# Patient Record
Sex: MALE | Race: WHITE | Employment: OTHER | URBAN - METROPOLITAN AREA
[De-identification: names, ages, dates, MRNs, and addresses within clinical notes are randomized per-mention and may not be internally consistent; named-entity substitution may affect disease eponyms.]

---

## 2017-01-03 DIAGNOSIS — I10 ESSENTIAL HYPERTENSION: Primary | ICD-10-CM

## 2017-01-04 RX ORDER — TRIAMTERENE AND HYDROCHLOROTHIAZIDE 75; 50 MG/1; MG/1
TABLET ORAL
Qty: 45 TABLET | Refills: 2 | Status: SHIPPED | OUTPATIENT
Start: 2017-01-04 | End: 2017-09-30

## 2017-01-04 RX ORDER — LISINOPRIL 40 MG/1
TABLET ORAL
Qty: 90 TABLET | Refills: 2 | Status: SHIPPED | OUTPATIENT
Start: 2017-01-04 | End: 2017-09-30

## 2017-01-04 RX ORDER — ATENOLOL 50 MG/1
TABLET ORAL
Qty: 90 TABLET | Refills: 2 | Status: SHIPPED | OUTPATIENT
Start: 2017-01-04 | End: 2017-09-30

## 2017-02-27 RX ORDER — ASPIRIN 81 MG
TABLET, DELAYED RELEASE (ENTERIC COATED) ORAL
Qty: 90 TABLET | Refills: 0 | Status: SHIPPED | OUTPATIENT
Start: 2017-02-27 | End: 2017-05-30

## 2017-02-27 NOTE — TELEPHONE ENCOUNTER
Refill request sent from pharmacy for Aspirin. Pt had Rx for this before. LOV 11/18/16 and labs 11/08/16. Will you approve refill ? Routed to Dr Cassidy Kinney.

## 2017-04-19 ENCOUNTER — APPOINTMENT (OUTPATIENT)
Dept: LAB | Facility: HOSPITAL | Age: 67
End: 2017-04-19
Payer: MEDICARE

## 2017-04-19 DIAGNOSIS — Z12.11 ENCOUNTER FOR SCREENING COLONOSCOPY: ICD-10-CM

## 2017-04-19 PROCEDURE — 93010 ELECTROCARDIOGRAM REPORT: CPT | Performed by: INTERNAL MEDICINE

## 2017-04-19 PROCEDURE — 93005 ELECTROCARDIOGRAM TRACING: CPT

## 2017-05-08 ENCOUNTER — TELEPHONE (OUTPATIENT)
Dept: FAMILY MEDICINE CLINIC | Facility: CLINIC | Age: 67
End: 2017-05-08

## 2017-05-08 DIAGNOSIS — I10 ESSENTIAL HYPERTENSION, BENIGN: ICD-10-CM

## 2017-05-08 DIAGNOSIS — E11.49 TYPE II DIABETES MELLITUS WITH NEUROLOGICAL MANIFESTATIONS (HCC): Primary | ICD-10-CM

## 2017-05-08 NOTE — TELEPHONE ENCOUNTER
Patient scheduled MA supervisit with Dr. Elisabeth Salazar on 9/5/17.  Would like labs placed at [address] [place] [address].

## 2017-05-09 NOTE — TELEPHONE ENCOUNTER
Spoke to patient regarding his fasting labs 10-12 hours water only. I asked him if he wanted to schedule sooner to address his DM per Dr. Janee Pedraza and he stated he would like to keep it in September.

## 2017-05-17 ENCOUNTER — ANESTHESIA (OUTPATIENT)
Dept: ENDOSCOPY | Facility: HOSPITAL | Age: 67
End: 2017-05-17
Payer: MEDICARE

## 2017-05-17 ENCOUNTER — ANESTHESIA EVENT (OUTPATIENT)
Dept: ENDOSCOPY | Facility: HOSPITAL | Age: 67
End: 2017-05-17
Payer: MEDICARE

## 2017-05-17 ENCOUNTER — HOSPITAL ENCOUNTER (OUTPATIENT)
Facility: HOSPITAL | Age: 67
Setting detail: HOSPITAL OUTPATIENT SURGERY
Discharge: HOME | End: 2017-05-17
Attending: INTERNAL MEDICINE | Admitting: INTERNAL MEDICINE
Payer: MEDICARE

## 2017-05-17 ENCOUNTER — SURGERY (OUTPATIENT)
Age: 67
End: 2017-05-17

## 2017-05-17 VITALS
BODY MASS INDEX: 35.55 KG/M2 | WEIGHT: 240 LBS | HEART RATE: 65 BPM | SYSTOLIC BLOOD PRESSURE: 108 MMHG | RESPIRATION RATE: 16 BRPM | OXYGEN SATURATION: 98 % | DIASTOLIC BLOOD PRESSURE: 64 MMHG | TEMPERATURE: 98 F | HEIGHT: 69 IN

## 2017-05-17 DIAGNOSIS — Z12.11 ENCOUNTER FOR SCREENING COLONOSCOPY: Primary | ICD-10-CM

## 2017-05-17 PROCEDURE — 0DBL8ZX EXCISION OF TRANSVERSE COLON, VIA NATURAL OR ARTIFICIAL OPENING ENDOSCOPIC, DIAGNOSTIC: ICD-10-PCS | Performed by: INTERNAL MEDICINE

## 2017-05-17 PROCEDURE — 0DBH8ZX EXCISION OF CECUM, VIA NATURAL OR ARTIFICIAL OPENING ENDOSCOPIC, DIAGNOSTIC: ICD-10-PCS | Performed by: INTERNAL MEDICINE

## 2017-05-17 PROCEDURE — 88305 TISSUE EXAM BY PATHOLOGIST: CPT | Performed by: INTERNAL MEDICINE

## 2017-05-17 PROCEDURE — 82962 GLUCOSE BLOOD TEST: CPT

## 2017-05-17 RX ORDER — SODIUM CHLORIDE, SODIUM LACTATE, POTASSIUM CHLORIDE, CALCIUM CHLORIDE 600; 310; 30; 20 MG/100ML; MG/100ML; MG/100ML; MG/100ML
INJECTION, SOLUTION INTRAVENOUS CONTINUOUS
Status: DISCONTINUED | OUTPATIENT
Start: 2017-05-17 | End: 2017-05-17

## 2017-05-17 NOTE — ANESTHESIA PREPROCEDURE EVALUATION
PRE-OP EVALUATION    Patient Name: Edward Griffiths    Pre-op Diagnosis: PERSONAL HISTORY OF COLONIC POLYPS    Procedure(s):  COLONOSCOPY     Surgeon(s) and Role:     * Agustín Serrano,  - Dejuan    Pre-op vitals reviewed.   Temp: 97.9 °F (36.6 °C)  Pulse Bilateral         Smoking status: Former Smoker  0.50 Packs/Day  For 5.00 Years     Quit date: 01/01/1970    Smokeless tobacco: Never Used    Alcohol Use: Yes  0.0 - 0.6 oz/week    0-1 Standard drinks or equivalent per week            Drug Use: No     Avai

## 2017-05-17 NOTE — OPERATIVE REPORT
COLONOSCOPY WITH BIOPSY AND SNARE POLYPECTOMY      [address] Patient Status:  Hospital Outpatient Surgery    1950 MRN [id number]   [facility] ENDOSCOPY Attending Ruperto Soto, [address] [facility]note 2[facility] Day # 0 PCP Hannah Morales colon: Few scattered diverticula. Transverse colon: Two sessile polyps ranging 5-6 mm s/p cold snare polypectomy. Descending colon: The mucosa and vascular pattern were normal.  Sigmoid colon: Multiple diverticula.    Rectosigmoid/Rectum: The previous E

## 2017-05-17 NOTE — H&P
History & Physical Examination    Patient Name: Mark Tello  MRN: [id number]  CSN: [id number]  YOB: 1950    Diagnosis: Rectal polyp s/p EMR    Present Illness: Mr. Jorje Infante is a 78year-old male with rectal polyp s/p EMR presents for tejal Past Surgical History    BONE REPLACEMENT GRAFT  7/7/2010    Comment repair shattered L heel    REMOVAL OF KIDNEY STONE  1980    SKIN SURGERY  8-19-15    Comment Mohs surgery for [facility] to left lower eyelid    EGD N/A 9/23/2016    Comment Procedure: E

## 2017-05-17 NOTE — ANESTHESIA POSTPROCEDURE EVALUATION
[address] Patient Status:  Hospital Outpatient Surgery   Age/Gender 78year old male MRN [id number]   Location [facility]. Attending Lou Aponte, [address] [facility] Day # 0 PCP Chas Acevedo MD       Anesthesia Post-op N

## 2017-05-31 RX ORDER — ASPIRIN 81 MG
TABLET, DELAYED RELEASE (ENTERIC COATED) ORAL
Qty: 90 TABLET | Refills: 3 | Status: SHIPPED | OUTPATIENT
Start: 2017-05-31 | End: 2019-10-20

## 2017-07-31 ENCOUNTER — OFFICE VISIT (OUTPATIENT)
Dept: FAMILY MEDICINE CLINIC | Facility: CLINIC | Age: 67
End: 2017-07-31

## 2017-07-31 VITALS
OXYGEN SATURATION: 98 % | WEIGHT: 240 LBS | BODY MASS INDEX: 35.55 KG/M2 | DIASTOLIC BLOOD PRESSURE: 100 MMHG | SYSTOLIC BLOOD PRESSURE: 176 MMHG | TEMPERATURE: 98 F | HEIGHT: 69 IN | HEART RATE: 62 BPM

## 2017-07-31 DIAGNOSIS — R05.9 COUGH: ICD-10-CM

## 2017-07-31 DIAGNOSIS — J01.00 ACUTE NON-RECURRENT MAXILLARY SINUSITIS: Primary | ICD-10-CM

## 2017-07-31 PROCEDURE — 99213 OFFICE O/P EST LOW 20 MIN: CPT | Performed by: FAMILY MEDICINE

## 2017-07-31 RX ORDER — BENZONATATE 100 MG/1
200 CAPSULE ORAL 3 TIMES DAILY PRN
Qty: 30 CAPSULE | Refills: 0 | Status: SHIPPED | OUTPATIENT
Start: 2017-07-31 | End: 2017-09-05 | Stop reason: ALTCHOICE

## 2017-07-31 RX ORDER — CLARITHROMYCIN 500 MG/1
500 TABLET, COATED ORAL 2 TIMES DAILY
Qty: 20 TABLET | Refills: 0 | Status: SHIPPED | OUTPATIENT
Start: 2017-07-31 | End: 2017-08-10

## 2017-07-31 RX ORDER — POLYETHYLENE GLYCOL 3350, SODIUM CHLORIDE, POTASSIUM CHLORIDE, SODIUM BICARBONATE, AND SODIUM SULFATE 240; 5.84; 2.98; 6.72; 22.72 G/4L; G/4L; G/4L; G/4L; G/4L
POWDER, FOR SOLUTION ORAL
Refills: 0 | COMMUNITY
Start: 2017-05-15 | End: 2017-06-01

## 2017-07-31 NOTE — PATIENT INSTRUCTIONS
Take antibiotics with food and plenty of water. Eat yogurt or take probiotic daily. (Ingrid Godinez is a good example of an OTC probiotic)  Make sure to finish the entire antibiotic treatment.   Increase fluids and rest.   Take tessalon perles-- 1-2 every 8 hours

## 2017-08-01 NOTE — PROGRESS NOTES
CHIEF COMPLAINT:   Patient presents with:  Cough/URI: started with fever/body aches, sx started improving after 3-4 days, but now cough is more spasmodic. worse with talking. feels a bit wheezy.        HPI:   Angel Luis Sharpe is a 78year old male who present Essential hypertension, benign 6/29/2012   • Glucose intolerance (pre-diabetes)    • High blood pressure    • Impaired fasting glucose 6/29/2012   • Nephrolithiasis    • Peripheral neuropathy, idiopathic 1/14/2013   • Sleep apnea    • Visual impairment abdominal pain  NEURO: + sinus headaches. No numbness or tingling in face.     EXAM:   BP (!) 176/100   Pulse 62   Temp 98.1 °F (36.7 °C) (Oral)   Ht 69\"   Wt 240 lb   SpO2 98%   BMI 35.44 kg/m²   GENERAL: well developed, well nourished,in no apparent dis 8 hours as needed. Or use delsym otc. Consider applying miladys's vapo-rub or eucalpytus oil to chest and feet at bedtime to reduce chest and nasal congestion. Monitor symptoms and contact the office if no better in 2-3 days.     Call Dr. Janee Pedraza today to jm

## 2017-08-23 LAB
ALBUMIN/GLOBULIN RATIO: 1.7 (CALC) (ref 1–2.5)
ALBUMIN: 4.2 G/DL (ref 3.6–5.1)
ALKALINE PHOSPHATASE: 49 U/L (ref 40–115)
ALT: 29 U/L (ref 9–46)
AST: 27 U/L (ref 10–35)
BILIRUBIN, TOTAL: 0.6 MG/DL (ref 0.2–1.2)
BUN: 17 MG/DL (ref 7–25)
CALCIUM: 9.3 MG/DL (ref 8.6–10.3)
CARBON DIOXIDE: 24 MMOL/L (ref 20–31)
CHLORIDE: 103 MMOL/L (ref 98–110)
CHOL/HDLC RATIO: 3.3 (CALC)
CHOLESTEROL, TOTAL: 145 MG/DL
CREATININE, RANDOM URINE: 102 MG/DL (ref 20–370)
CREATININE: 0.99 MG/DL (ref 0.7–1.25)
EGFR IF AFRICN AM: 91 ML/MIN/1.73M2
EGFR IF NONAFRICN AM: 78 ML/MIN/1.73M2
GLOBULIN: 2.5 G/DL (CALC) (ref 1.9–3.7)
GLUCOSE: 118 MG/DL (ref 65–99)
HDL CHOLESTEROL: 44 MG/DL
HEMOGLOBIN A1C: 6.4 % OF TOTAL HGB
LDL-CHOLESTEROL: 82 MG/DL (CALC)
MICROALBUMIN/CREATININE RATIO, RANDOM URINE: 20 MCG/MG CREAT
MICROALBUMIN: 2 MG/DL
NON-HDL CHOLESTEROL: 101 MG/DL (CALC)
POTASSIUM: 3.7 MMOL/L (ref 3.5–5.3)
PROTEIN, TOTAL: 6.7 G/DL (ref 6.1–8.1)
SODIUM: 138 MMOL/L (ref 135–146)
TRIGLYCERIDES: 96 MG/DL

## 2017-08-30 ENCOUNTER — TELEPHONE (OUTPATIENT)
Dept: FAMILY MEDICINE CLINIC | Facility: CLINIC | Age: 67
End: 2017-08-30

## 2017-08-30 NOTE — TELEPHONE ENCOUNTER
Called patient and left message to contact the office to complete annual health assessment before appointment 09/05/17

## 2017-09-05 ENCOUNTER — OFFICE VISIT (OUTPATIENT)
Dept: FAMILY MEDICINE CLINIC | Facility: CLINIC | Age: 67
End: 2017-09-05

## 2017-09-05 VITALS
SYSTOLIC BLOOD PRESSURE: 130 MMHG | TEMPERATURE: 98 F | DIASTOLIC BLOOD PRESSURE: 72 MMHG | HEIGHT: 67.5 IN | RESPIRATION RATE: 14 BRPM | BODY MASS INDEX: 36.45 KG/M2 | HEART RATE: 68 BPM | WEIGHT: 235 LBS

## 2017-09-05 DIAGNOSIS — Z99.89 OSA ON CPAP: ICD-10-CM

## 2017-09-05 DIAGNOSIS — Z12.5 SCREENING FOR MALIGNANT NEOPLASM OF PROSTATE: ICD-10-CM

## 2017-09-05 DIAGNOSIS — E11.42 DIABETIC PERIPHERAL NEUROPATHY ASSOCIATED WITH TYPE 2 DIABETES MELLITUS (HCC): ICD-10-CM

## 2017-09-05 DIAGNOSIS — I10 ESSENTIAL HYPERTENSION, BENIGN: ICD-10-CM

## 2017-09-05 DIAGNOSIS — G47.33 OSA ON CPAP: ICD-10-CM

## 2017-09-05 DIAGNOSIS — Z00.00 ENCOUNTER FOR ANNUAL HEALTH EXAMINATION: Primary | ICD-10-CM

## 2017-09-05 DIAGNOSIS — E11.49 TYPE II DIABETES MELLITUS WITH NEUROLOGICAL MANIFESTATIONS (HCC): ICD-10-CM

## 2017-09-05 DIAGNOSIS — Z85.828 HISTORY OF BASAL CELL CARCINOMA OF EYELID: ICD-10-CM

## 2017-09-05 DIAGNOSIS — E66.01 SEVERE OBESITY WITH BODY MASS INDEX (BMI) OF 35.0 TO 39.9 WITH COMORBIDITY (HCC): ICD-10-CM

## 2017-09-05 PROCEDURE — 96160 PT-FOCUSED HLTH RISK ASSMT: CPT | Performed by: FAMILY MEDICINE

## 2017-09-05 PROCEDURE — G0439 PPPS, SUBSEQ VISIT: HCPCS | Performed by: FAMILY MEDICINE

## 2017-09-05 NOTE — PROGRESS NOTES
HPI:   Abdulaziz Campbell is a 78year old male who presents for a MA (Medicare Advantage) Supervisit (Once per calendar year). Preventative Screening  Colonoscopy - May of this year. Due for repeat 1 yr.  Danny Bethea at [address] [place] [address]. Prostate - \"ok\".   Stil Ni Tadeo MD:  ASPIRIN EC LOW DOSE 81 MG Oral Tab EC TAKE 1 TABLET BY MOUTH DAILY   TRIAMTERENE-HCTZ 75-50 MG Oral Tab TAKE ONE-HALF TABLET BY MOUTH DAILY   LISINOPRIL 40 MG Oral Tab TAKE 1 TABLET BY MOUTH ONCE DAILY   ATENOLOL 50 MG Oral Tab TAKE nocturia, no complaint of urinary incontinence  MUSCULOSKELETAL: denies back pain  NEURO: denies headaches  PSYCHE: denies depression or anxiety  HEMATOLOGIC: denies hx of anemia  ENDOCRINE: denies thyroid history  ALL/ASTHMA: denies hx of allergy or asthm monofilament/sensation of both feet is abnormal only very distally. Normal on midfoot. .  Pulsation pedal pulse exam of both lower legs/feet is normal as well.    Pulses: 2+ and symmetric   Skin: Skin color, texture, turgor normal, no rashes or lesions   L file in [facility]:    Mayurinés Esequiel does not have a Power of  for [employer] on file in [address] [place] [address] Rd. Discussed with patient and provided information           PLAN:  The patient indicates understanding of these issues and agrees to the plan.     No Follow-up 0-No    Fall/Risk Scorin    Scoring Interpretation: 0 - 3 No Risk     Depression Screening (PHQ-2/PHQ-9): Over the LAST 2 WEEKS   Little interest or pleasure in doing things (over the last two weeks)?: Not at all    Feeling down, depressed, or hopeless Dilated Eye Exam 3/19/2016       Prostate Cancer Screening      PSA  Annually PSA due on 11/14/2017  Update Health Maintenance if applicable   Immunizations      Influenza No orders found for this or any previous visit.  Update Immunization Activity if appl

## 2017-09-05 NOTE — PATIENT INSTRUCTIONS
Alina Rasheed's SCREENING SCHEDULE   Tests on this list are recommended by your physician but may not be covered, or covered at this frequency, by your insurer. Please check with your insurance carrier before scheduling to verify coverage.     PREVENTATI Colonoscopy Screen   Covered every 10 years- more often if abnormal Colonoscopy,10 Years due on 05/17/2027 Update Health Maintenance if applicable    Flex Sigmoidoscopy Screen  Covered every 5 years No results found for this or any previous visit.  No fl visit. This may be covered with your prescription benefits, but Medicare does not cover unless Medically needed    Zoster (Not covered by Medicare Part B) No orders found for this or any previous visit.  This may be covered with your pharmacy  prescription

## 2017-09-20 ENCOUNTER — PATIENT OUTREACH (OUTPATIENT)
Dept: CASE MANAGEMENT | Age: 67
End: 2017-09-20

## 2017-09-30 DIAGNOSIS — I10 ESSENTIAL HYPERTENSION: ICD-10-CM

## 2017-09-30 RX ORDER — ATENOLOL 50 MG/1
TABLET ORAL
Qty: 90 TABLET | Refills: 0 | Status: SHIPPED | OUTPATIENT
Start: 2017-09-30 | End: 2018-03-08

## 2017-09-30 RX ORDER — LISINOPRIL 40 MG/1
TABLET ORAL
Qty: 90 TABLET | Refills: 0 | Status: SHIPPED | OUTPATIENT
Start: 2017-09-30 | End: 2018-01-03

## 2017-09-30 RX ORDER — TRIAMTERENE AND HYDROCHLOROTHIAZIDE 75; 50 MG/1; MG/1
TABLET ORAL
Qty: 45 TABLET | Refills: 0 | Status: SHIPPED | OUTPATIENT
Start: 2017-09-30 | End: 2018-01-03

## 2017-10-05 ENCOUNTER — TELEPHONE (OUTPATIENT)
Dept: FAMILY MEDICINE CLINIC | Facility: CLINIC | Age: 67
End: 2017-10-05

## 2017-10-05 RX ORDER — METOPROLOL SUCCINATE 100 MG/1
100 TABLET, EXTENDED RELEASE ORAL DAILY
Qty: 90 TABLET | Refills: 1 | Status: SHIPPED | OUTPATIENT
Start: 2017-10-05 | End: 2018-09-26

## 2017-10-09 ENCOUNTER — TELEPHONE (OUTPATIENT)
Dept: FAMILY MEDICINE CLINIC | Facility: CLINIC | Age: 67
End: 2017-10-09

## 2017-10-09 NOTE — TELEPHONE ENCOUNTER
Talked to patient assured him this was the proper dose as these are not bioequivalent he is OK with this and will take the medication as directed

## 2017-10-09 NOTE — TELEPHONE ENCOUNTER
Patient was currently prescribed Metoprolol Succinate  MG Oral Tablet 24 Hr due to atenolol being on back order. Patient is concerned about MG, states metoprolol is 100 MG and atenolol is 50 MG. Should he being taking half a pill?

## 2018-01-03 DIAGNOSIS — I10 ESSENTIAL HYPERTENSION: ICD-10-CM

## 2018-01-03 RX ORDER — LISINOPRIL 40 MG/1
TABLET ORAL
Qty: 90 TABLET | Refills: 1 | Status: SHIPPED | OUTPATIENT
Start: 2018-01-03 | End: 2018-06-30

## 2018-01-03 RX ORDER — TRIAMTERENE AND HYDROCHLOROTHIAZIDE 75; 50 MG/1; MG/1
TABLET ORAL
Qty: 45 TABLET | Refills: 1 | Status: SHIPPED | OUTPATIENT
Start: 2018-01-03 | End: 2019-01-12

## 2018-02-27 ENCOUNTER — TELEPHONE (OUTPATIENT)
Dept: FAMILY MEDICINE CLINIC | Facility: CLINIC | Age: 68
End: 2018-02-27

## 2018-02-27 DIAGNOSIS — Z12.5 SCREENING FOR MALIGNANT NEOPLASM OF PROSTATE: ICD-10-CM

## 2018-02-27 DIAGNOSIS — E11.49 TYPE II DIABETES MELLITUS WITH NEUROLOGICAL MANIFESTATIONS (HCC): ICD-10-CM

## 2018-02-27 DIAGNOSIS — Z13.6 SCREENING FOR CARDIOVASCULAR CONDITION: ICD-10-CM

## 2018-02-27 DIAGNOSIS — I10 ESSENTIAL HYPERTENSION, BENIGN: Primary | ICD-10-CM

## 2018-02-27 NOTE — TELEPHONE ENCOUNTER
Pt scheduled a Supervisit and would like a blood work order sent to [facility] he would like to have a Prostate test added to the order.

## 2018-03-02 ENCOUNTER — TELEPHONE (OUTPATIENT)
Dept: FAMILY MEDICINE CLINIC | Facility: CLINIC | Age: 68
End: 2018-03-02

## 2018-03-02 LAB
ALBUMIN/GLOBULIN RATIO: 1.5 (CALC) (ref 1–2.5)
ALBUMIN: 4.3 G/DL (ref 3.6–5.1)
ALKALINE PHOSPHATASE: 56 U/L (ref 40–115)
ALT: 20 U/L (ref 9–46)
AST: 22 U/L (ref 10–35)
BILIRUBIN, TOTAL: 0.7 MG/DL (ref 0.2–1.2)
BUN: 22 MG/DL (ref 7–25)
CALCIUM: 9.9 MG/DL (ref 8.6–10.3)
CARBON DIOXIDE: 26 MMOL/L (ref 20–31)
CHLORIDE: 104 MMOL/L (ref 98–110)
CHOL/HDLC RATIO: 3.2 (CALC)
CHOLESTEROL, TOTAL: 146 MG/DL
CREATININE, RANDOM URINE: 30 MG/DL (ref 20–370)
CREATININE: 0.96 MG/DL (ref 0.7–1.25)
EGFR IF AFRICN AM: 94 ML/MIN/1.73M2
EGFR IF NONAFRICN AM: 81 ML/MIN/1.73M2
GLOBULIN: 2.8 G/DL (CALC) (ref 1.9–3.7)
GLUCOSE: 110 MG/DL (ref 65–99)
HDL CHOLESTEROL: 45 MG/DL
HEMOGLOBIN A1C: 5.7 % OF TOTAL HGB
LDL-CHOLESTEROL: 84 MG/DL (CALC)
MICROALBUMIN/CREATININE RATIO, RANDOM URINE: 27 MCG/MG CREAT
MICROALBUMIN: 0.8 MG/DL
NON-HDL CHOLESTEROL: 101 MG/DL (CALC)
POTASSIUM: 4 MMOL/L (ref 3.5–5.3)
PROTEIN, TOTAL: 7.1 G/DL (ref 6.1–8.1)
PSA, TOTAL: 2.4 NG/ML
SODIUM: 141 MMOL/L (ref 135–146)
TRIGLYCERIDES: 84 MG/DL

## 2018-03-02 NOTE — TELEPHONE ENCOUNTER
PALAK for patient to call back to complete his annual health assessment prior to his appt with Dr. Cassidy Kinney on 3/8/18 at [address] [place] [address].

## 2018-03-08 ENCOUNTER — OFFICE VISIT (OUTPATIENT)
Dept: FAMILY MEDICINE CLINIC | Facility: CLINIC | Age: 68
End: 2018-03-08

## 2018-03-08 VITALS
SYSTOLIC BLOOD PRESSURE: 128 MMHG | BODY MASS INDEX: 33.87 KG/M2 | HEART RATE: 56 BPM | OXYGEN SATURATION: 98 % | TEMPERATURE: 98 F | HEIGHT: 67 IN | WEIGHT: 215.81 LBS | DIASTOLIC BLOOD PRESSURE: 64 MMHG | RESPIRATION RATE: 16 BRPM

## 2018-03-08 DIAGNOSIS — Z00.00 ENCOUNTER FOR ANNUAL HEALTH EXAMINATION: Primary | ICD-10-CM

## 2018-03-08 DIAGNOSIS — E66.01 SEVERE OBESITY WITH BODY MASS INDEX (BMI) OF 35.0 TO 39.9 WITH COMORBIDITY (HCC): ICD-10-CM

## 2018-03-08 DIAGNOSIS — G47.33 OSA ON CPAP: ICD-10-CM

## 2018-03-08 DIAGNOSIS — E11.42 DIABETIC PERIPHERAL NEUROPATHY ASSOCIATED WITH TYPE 2 DIABETES MELLITUS (HCC): ICD-10-CM

## 2018-03-08 DIAGNOSIS — I10 ESSENTIAL HYPERTENSION, BENIGN: ICD-10-CM

## 2018-03-08 DIAGNOSIS — Z85.828 HISTORY OF BASAL CELL CARCINOMA OF EYELID: ICD-10-CM

## 2018-03-08 DIAGNOSIS — M47.816 ARTHRITIS OF LUMBAR SPINE: ICD-10-CM

## 2018-03-08 DIAGNOSIS — E11.49 TYPE II DIABETES MELLITUS WITH NEUROLOGICAL MANIFESTATIONS (HCC): ICD-10-CM

## 2018-03-08 DIAGNOSIS — Z99.89 OSA ON CPAP: ICD-10-CM

## 2018-03-08 PROCEDURE — 96160 PT-FOCUSED HLTH RISK ASSMT: CPT | Performed by: FAMILY MEDICINE

## 2018-03-08 PROCEDURE — G0439 PPPS, SUBSEQ VISIT: HCPCS | Performed by: FAMILY MEDICINE

## 2018-03-08 NOTE — PATIENT INSTRUCTIONS
Scotty Rasheed's SCREENING SCHEDULE   Tests on this list are recommended by your physician but may not be covered, or covered at this frequency, by your insurer. Please check with your insurance carrier before scheduling to verify coverage.     PREVENTATI Colonoscopy Screen   Covered every 10 years- more often if abnormal Colonoscopy,10 Years due on 05/17/2027 Update Health Maintenance if applicable    Flex Sigmoidoscopy Screen  Covered every 5 years No results found for this or any previous visit.  No fl visit. This may be covered with your prescription benefits, but Medicare does not cover unless Medically needed    Zoster (Not covered by Medicare Part B) No orders found for this or any previous visit.  This may be covered with your pharmacy  prescription

## 2018-03-08 NOTE — PROGRESS NOTES
HPI:   Carina Mendoza is a 75year old male who presents for a MA (Medicare Advantage) Supervisit (Once per calendar year). Preventative Screening  Colonoscopy - 5/2017 - normal, repeat 1 yrs. Maggie. Prostate - PSA controlled - 2.4.   Not waking up He smoked tobacco in the past but quit greater than 12 months ago.   Smoking status: Former Smoker                                                              Packs/day: 0.50      Years: 5.00         Quit date: 1/1/1970  Smokeless tobacco: Never Used Hr Take 1 tablet (100 mg total) by mouth daily. ASPIRIN EC LOW DOSE 81 MG Oral Tab EC TAKE 1 TABLET BY MOUTH DAILY   multivitamin with iron Oral Tab Take 1 tablet by mouth daily.       MEDICAL INFORMATION:   He  has a past medical history of Basal cell ca 3/8/2018  9:01 AM  Entry User:  Edwin Jones RN  Screening Method:  Whisper Test  Whisper Test Result:  Pass               Visual Acuity  Right Eye Visual Acuity: Corrected Right Eye Chart Acuity: 20/20   Left Eye Visual Acuity: Corrected Left Eye Ирина CHRONIC CONDITIONS:   Tenisha Baez is a 75year old male who presents for a Medicare Assessment.      PLAN SUMMARY:     Tenisha Baez was seen in the office today:  had concerns including Physical (wants to discuss changing BP med as he has lost 40 lbs ) your current health state?: Good  How do you maintain positive mental well-being?: Social Interaction;Puzzles;Games; Visiting Friends; Visiting Family    This section provided for quick review of chart, separate sheet to patient  [address] [place] concentrates   Clients of institutions for the mentally retarded   Persons who live in the same house as a HepB virus carrier   Homosexual men   Illicit injectable drug abusers     Tetanus Toxoid  Only covered with a cut with metal- TD and TDaP Not covered

## 2018-05-15 RX ORDER — SODIUM CHLORIDE, SODIUM LACTATE, POTASSIUM CHLORIDE, CALCIUM CHLORIDE 600; 310; 30; 20 MG/100ML; MG/100ML; MG/100ML; MG/100ML
INJECTION, SOLUTION INTRAVENOUS CONTINUOUS
Status: CANCELLED | OUTPATIENT
Start: 2018-05-15

## 2018-06-01 ENCOUNTER — SURGERY (OUTPATIENT)
Age: 68
End: 2018-06-01

## 2018-06-01 ENCOUNTER — HOSPITAL ENCOUNTER (OUTPATIENT)
Facility: HOSPITAL | Age: 68
Setting detail: HOSPITAL OUTPATIENT SURGERY
Discharge: HOME | End: 2018-06-01
Attending: INTERNAL MEDICINE | Admitting: INTERNAL MEDICINE
Payer: MEDICARE

## 2018-06-01 ENCOUNTER — ANESTHESIA (OUTPATIENT)
Dept: ENDOSCOPY | Facility: HOSPITAL | Age: 68
End: 2018-06-01
Payer: MEDICARE

## 2018-06-01 ENCOUNTER — ANESTHESIA EVENT (OUTPATIENT)
Dept: ENDOSCOPY | Facility: HOSPITAL | Age: 68
End: 2018-06-01
Payer: MEDICARE

## 2018-06-01 VITALS
WEIGHT: 211 LBS | HEIGHT: 69 IN | RESPIRATION RATE: 16 BRPM | SYSTOLIC BLOOD PRESSURE: 96 MMHG | BODY MASS INDEX: 31.25 KG/M2 | OXYGEN SATURATION: 98 % | HEART RATE: 53 BPM | DIASTOLIC BLOOD PRESSURE: 61 MMHG | TEMPERATURE: 98 F

## 2018-06-01 DIAGNOSIS — Z86.010 PERSONAL HISTORY OF COLONIC POLYPS: ICD-10-CM

## 2018-06-01 PROCEDURE — 0DJD8ZZ INSPECTION OF LOWER INTESTINAL TRACT, VIA NATURAL OR ARTIFICIAL OPENING ENDOSCOPIC: ICD-10-PCS | Performed by: INTERNAL MEDICINE

## 2018-06-01 RX ORDER — SODIUM CHLORIDE, SODIUM LACTATE, POTASSIUM CHLORIDE, CALCIUM CHLORIDE 600; 310; 30; 20 MG/100ML; MG/100ML; MG/100ML; MG/100ML
INJECTION, SOLUTION INTRAVENOUS CONTINUOUS
Status: DISCONTINUED | OUTPATIENT
Start: 2018-06-01 | End: 2018-06-01

## 2018-06-01 NOTE — ADDENDUM NOTE
Addendum  created 06/01/18 0757 by Suzi Alcantara MD    Anesthesia Review and Sign - Ready for Procedure

## 2018-06-01 NOTE — H&P
History & Physical Examination    Patient Name: Danny Blankenship  MRN: [id number]  [facility]: [id number]  YOB: 1950    Diagnosis: History of large colon polyp s/p endoscopic mucosal resection    Present Illness: 74 y/o M history as above presents for Surgical History:  7/7/2010: BONE REPLACEMENT GRAFT      Comment: repair shattered L heel  No date: CATARACT Bilateral  9/23/2016: COLONOSCOPY N/A      Comment: Procedure: COLONOSCOPY;  Surgeon: Clemencia Roberto DO;  Location: [address] [place] [address]

## 2018-06-01 NOTE — ANESTHESIA POSTPROCEDURE EVALUATION
[address] Patient Status:  Hospital Outpatient Surgery   Age/Gender 75year old male MRN [id number]   Location [address] [facility]. Attending Chalo Caruso, [address] [facility] Day # 0 PCP Zabrina Armstrong MD       Anesthesia Post-op N

## 2018-06-01 NOTE — ANESTHESIA PREPROCEDURE EVALUATION
PRE-OP EVALUATION    Patient Name: Corazon Rodriguez    Pre-op Diagnosis: Personal history of colonic polyps [Z86.010]    Procedure(s):  COLONOSCOPY    Surgeon(s) and Role:     * Agustín Serrano, DO - Primary    Pre-op vitals reviewed.         Body mass index Location: [address] [place] [address]                ENDOSCOPY  10/12/2016: FLEX SIG N/A      Comment: Procedure: FLEXIBLE SIGMOIDOSCOPY;  Surgeon:                Ruperto Soto DO;  Location: [address] [place] [address] ENDOSCOPY  1980: REMOVAL OF KIDNEY STONE  8-19-15: SKIN SURGERY      Comment:  Mohs

## 2018-06-01 NOTE — OPERATIVE REPORT
[address] Patient Status:  Hospital Outpatient Surgery    1950 MRN [id number]   [facility] ENDOSCOPY Attending Sylvia Roe DO   Hosp Day # 0 PCP Marc Aviles MD       PREOPERATIVE DIAGNOSIS/INDICATION: History of mucosa and vascular pattern were normal.  Descending colon: The mucosa and vascular pattern were normal.  Sigmoid colon: Diverticulosis. Scar visualized at rectosigmoid junction at prior EMR without residual polyp tissue. Rectum:  The mucosa and vascular

## 2018-06-30 DIAGNOSIS — I10 ESSENTIAL HYPERTENSION: ICD-10-CM

## 2018-07-02 RX ORDER — LISINOPRIL 40 MG/1
TABLET ORAL
Qty: 90 TABLET | Refills: 1 | Status: SHIPPED | OUTPATIENT
Start: 2018-07-02 | End: 2018-12-27

## 2018-07-20 NOTE — TELEPHONE ENCOUNTER
Fax received from [facility] requesting a refill of Metoprolol 100 mg. At [address] [place] [address]  3/29/18, Dr Lizet Lara states that Metoprolol was to be decreased from 100 to 50 mg and that if BP controlled, will then stop Metoprolol.  Pt has BP machine at home and [facility]

## 2018-07-30 NOTE — TELEPHONE ENCOUNTER
Readings are not listed up front. LM asking patient to contact our office to see when he plans on dropping off blood pressure readings.

## 2018-07-31 ENCOUNTER — PATIENT MESSAGE (OUTPATIENT)
Dept: FAMILY MEDICINE CLINIC | Facility: CLINIC | Age: 68
End: 2018-07-31

## 2018-08-01 NOTE — TELEPHONE ENCOUNTER
From: Cy Kurtz  To: Boom Tinoco MD  Sent: 7/31/2018 11:20 AM CDT  Subject: Visit Follow-up Question    Dr. Lynda Shafer:    Per our last visit I agreed to take BP readings to validate reducing some of the medication ( 100mg to 50mg of metoprolol) I w

## 2018-08-03 RX ORDER — METOPROLOL SUCCINATE 50 MG/1
50 TABLET, EXTENDED RELEASE ORAL DAILY
Qty: 90 TABLET | Refills: 1 | Status: SHIPPED | OUTPATIENT
Start: 2018-08-03 | End: 2019-02-04

## 2018-08-08 ENCOUNTER — PATIENT MESSAGE (OUTPATIENT)
Dept: FAMILY MEDICINE CLINIC | Facility: CLINIC | Age: 68
End: 2018-08-08

## 2018-08-08 ENCOUNTER — TELEPHONE (OUTPATIENT)
Dept: FAMILY MEDICINE CLINIC | Facility: CLINIC | Age: 68
End: 2018-08-08

## 2018-08-08 NOTE — TELEPHONE ENCOUNTER
From: Shelley Coughlin  To: Yoel Piña MD  Sent: 8/8/2018 10:11 AM CDT  Subject: Non-Urgent Medical Question    Dr. Leonarda Goodman:    Did you ever receive a copy the results of the colonoscopy I had on 6/1/2018 with Dr. Feroz Pierre?  I never did but was wonderin

## 2018-08-08 NOTE — TELEPHONE ENCOUNTER
TC to patient we received a fax that patient received his CPAP supplies. Since we did not enter this referral asked patient if he is following with pulmonary. He did see Dr. Calvin Cleary.   TC to Dr. Calvin Cleary to receive records from his most recent visit of 5

## 2018-09-24 ENCOUNTER — TELEPHONE (OUTPATIENT)
Dept: FAMILY MEDICINE CLINIC | Facility: CLINIC | Age: 68
End: 2018-09-24

## 2018-09-24 DIAGNOSIS — Z01.818 PRE-OPERATIVE CLEARANCE: Primary | ICD-10-CM

## 2018-09-24 NOTE — TELEPHONE ENCOUNTER
Krishna Thomson MD  [facility] Clinical Staff             Can we assist this patient in getting the labs ordered and an appt for his clearance?      Thanks so much   -merry

## 2018-09-24 NOTE — TELEPHONE ENCOUNTER
Front Staff, please assist patient with schedule pre-op exam & EKG with Dr Lisa Garcia, 30 minutes.   A lab order has been placed at [address] [place] [address] (his preferred lab) that needs to be complete prior to the pre-op visit, (fasting 4 hours)

## 2018-09-24 NOTE — TELEPHONE ENCOUNTER
Rina Calloway [facility]            9/29/1950     Procedure:  Right knee scope, partial medial meniscectomy, chondroplasty     Surgery Date:  10-4-2018                          Location: [facility]     outpatient     [] Hematocrit/Hemogram

## 2018-09-26 ENCOUNTER — OFFICE VISIT (OUTPATIENT)
Dept: FAMILY MEDICINE CLINIC | Facility: CLINIC | Age: 68
End: 2018-09-26
Payer: MEDICARE

## 2018-09-26 VITALS
HEART RATE: 84 BPM | DIASTOLIC BLOOD PRESSURE: 60 MMHG | BODY MASS INDEX: 33.41 KG/M2 | HEIGHT: 68.5 IN | RESPIRATION RATE: 16 BRPM | SYSTOLIC BLOOD PRESSURE: 100 MMHG | TEMPERATURE: 98 F | WEIGHT: 223 LBS

## 2018-09-26 DIAGNOSIS — I10 ESSENTIAL HYPERTENSION: ICD-10-CM

## 2018-09-26 DIAGNOSIS — M25.561 ACUTE PAIN OF RIGHT KNEE: ICD-10-CM

## 2018-09-26 DIAGNOSIS — S83.241A TEAR OF MEDIAL MENISCUS OF RIGHT KNEE, CURRENT, UNSPECIFIED TEAR TYPE, INITIAL ENCOUNTER: ICD-10-CM

## 2018-09-26 DIAGNOSIS — E11.49 TYPE II DIABETES MELLITUS WITH NEUROLOGICAL MANIFESTATIONS (HCC): ICD-10-CM

## 2018-09-26 DIAGNOSIS — E11.42 DIABETIC PERIPHERAL NEUROPATHY ASSOCIATED WITH TYPE 2 DIABETES MELLITUS (HCC): ICD-10-CM

## 2018-09-26 DIAGNOSIS — Z99.89 OSA ON CPAP: ICD-10-CM

## 2018-09-26 DIAGNOSIS — G47.33 OSA ON CPAP: ICD-10-CM

## 2018-09-26 DIAGNOSIS — Z01.818 PREOP EXAMINATION: Primary | ICD-10-CM

## 2018-09-26 PROCEDURE — 99214 OFFICE O/P EST MOD 30 MIN: CPT | Performed by: FAMILY MEDICINE

## 2018-09-26 RX ORDER — TRAMADOL HYDROCHLORIDE 50 MG/1
50 TABLET ORAL NIGHTLY
Qty: 10 TABLET | Refills: 0 | Status: SHIPPED | OUTPATIENT
Start: 2018-09-26 | End: 2019-09-10 | Stop reason: ALTCHOICE

## 2018-09-26 NOTE — PROGRESS NOTES
Patient presents with:  Pre-Op Exam: clearance for knee surgery    HPI:   Rosie Mata is a 75year old male who is being evaluated for pre-surgical clearance at the request of Dr. Sukhwinder Kaplan.    Surgery: R knee scope, medial meniscectomy, chondroplasty  Boyd RASH    Past Surgical History:   Procedure Laterality Date   • Bone replacement graft  7/7/2010    repair shattered L heel   • Cataract Bilateral    • Removal of kidney stone  1980   • Skin surgery  8-19-15    Mohs surgery for BCC to left lower eyelid negative  Eyes: negative  Ears, nose, mouth, throat, and face: negative  Respiratory: negative  Cardiovascular: negative  Gastrointestinal: negative  Genitourinary: negative  Neurological: negative  MSK:  R knee pain, severe at times.    EXAM:   /60 examination  He is a good candidate. Given the pain and the MRI findings, I think surgery necessary  All current medical conditions under good control  EKG preop normal  Need labs. Pending the results, patient medically cleared.     2. Tear of medial meni

## 2018-10-14 ENCOUNTER — HOSPITAL ENCOUNTER (EMERGENCY)
Facility: HOSPITAL | Age: 68
Discharge: HOME | End: 2018-10-14
Attending: EMERGENCY MEDICINE
Payer: MEDICARE

## 2018-10-14 ENCOUNTER — APPOINTMENT (OUTPATIENT)
Dept: ULTRASOUND IMAGING | Facility: HOSPITAL | Age: 68
End: 2018-10-14
Attending: EMERGENCY MEDICINE
Payer: MEDICARE

## 2018-10-14 VITALS
DIASTOLIC BLOOD PRESSURE: 64 MMHG | SYSTOLIC BLOOD PRESSURE: 113 MMHG | HEART RATE: 66 BPM | RESPIRATION RATE: 16 BRPM | BODY MASS INDEX: 32.58 KG/M2 | OXYGEN SATURATION: 97 % | WEIGHT: 215 LBS | TEMPERATURE: 98 F | HEIGHT: 68 IN

## 2018-10-14 DIAGNOSIS — M71.21 BAKERS CYST, RIGHT: ICD-10-CM

## 2018-10-14 DIAGNOSIS — M79.661 RIGHT CALF PAIN: Primary | ICD-10-CM

## 2018-10-14 PROCEDURE — 99284 EMERGENCY DEPT VISIT MOD MDM: CPT

## 2018-10-14 PROCEDURE — 85025 COMPLETE CBC W/AUTO DIFF WBC: CPT | Performed by: EMERGENCY MEDICINE

## 2018-10-14 PROCEDURE — 93971 EXTREMITY STUDY: CPT | Performed by: EMERGENCY MEDICINE

## 2018-10-14 PROCEDURE — 36415 COLL VENOUS BLD VENIPUNCTURE: CPT

## 2018-10-14 NOTE — ED PROVIDER NOTES
Patient Seen in: [facility] Emergency Department    History   Patient presents with:  Postop/Procedure Problem  Lower Extremity Injury (musculoskeletal)    Stated Complaint: post op knee pain    HPI    This is a 59-year-old male who had a knee arthr 6/1/2018    Performed by Shon Mejia DO at [facility] ENDOSCOPY   • COLONOSCOPY N/A 5/17/2017    Performed by Shno Mejia DO at [facility] ENDOSCOPY   • COLONOSCOPY N/A 9/23/2016    Performed by Ishmael Collier DO at [facility] ENDOSCOPY   • EGD N/A 9/23/2016    Pro Regular without murmurs    Extremities: Good pulses bilaterally. There is noted to be some bruising behind the right knee. There is no large effusion there is no cellulitis good pulses noted. He is got normal range of motion of the knee.   Capillary re joint effusion is present which extends posteromedially with distention of the semimembranosus-gastrocnemius bursa, creating a Baker's cyst which measures approximately 2.8 x 1.9 x 1.0 cm.  The anterior and posterior cruciate ligaments as well as the medial CONCLUSION:  No evidence of deep venous thrombosis in the right lower extremity. Probable complex right Baker's cyst containing debris and/or clot. Clinical correlation recommended.     Dictated by: Chilo Cho MD on 10/14/2018 at 16:23     Raisa Cespedes

## 2018-10-15 ENCOUNTER — TELEPHONE (OUTPATIENT)
Dept: FAMILY MEDICINE CLINIC | Facility: CLINIC | Age: 68
End: 2018-10-15

## 2018-10-15 NOTE — TELEPHONE ENCOUNTER
Bobbi Ramos to see how he is doing after his visit yesterday at the ER due to having bruising and pain on right knee. Patient states he is doing fine, he did have knee surgery 1 week and a half ago.  Patient states ultrasound and blood work were perfor

## 2018-10-18 PROBLEM — Z98.890 S/P ARTHROSCOPIC SURGERY OF RIGHT KNEE: Status: ACTIVE | Noted: 2018-10-18

## 2018-10-24 ENCOUNTER — OFFICE VISIT (OUTPATIENT)
Dept: PHYSICAL THERAPY | Age: 68
End: 2018-10-24
Attending: ORTHOPAEDIC SURGERY
Payer: MEDICARE

## 2018-10-24 DIAGNOSIS — Z98.890 S/P ARTHROSCOPIC SURGERY OF RIGHT KNEE: ICD-10-CM

## 2018-10-24 DIAGNOSIS — M17.12 PRIMARY OSTEOARTHRITIS OF LEFT KNEE: ICD-10-CM

## 2018-10-24 DIAGNOSIS — S83.232D COMPLEX TEAR OF MEDIAL MENISCUS OF LEFT KNEE AS CURRENT INJURY, SUBSEQUENT ENCOUNTER: ICD-10-CM

## 2018-10-24 PROCEDURE — 97161 PT EVAL LOW COMPLEX 20 MIN: CPT

## 2018-10-24 PROCEDURE — 97530 THERAPEUTIC ACTIVITIES: CPT

## 2018-10-24 NOTE — PROGRESS NOTES
POST-OP KNEE EVALUATION:   Referring Physician: Dr. Yung Conklin  Diagnosis: R knee meniscectomy on 10/4      Date of Service: 10/24/2018     PATIENT SUMMARY   Rosie Mata is a 70year old y/o male who presents to therapy today s/p R knee meniscectomy on 1 10/4. He is most impaired with knee extension on the R as well as strength and endurance of knee flexors and extensors on the R when compared to the L.  He has impaired balance bilaterally complicated by the fact that he has loss of STJ motion on the L ankl :    Charges: PT Eval Low Complexity, there act: 1      Total Timed Treatment: 10 min     Total Treatment Time: 40 min     PLAN OF CARE:    Goals: (To be met in 8 visits)   · Pt will improve knee extension ROM to 0 deg to allow proper heel strike during ga care.    X___________________________________________________ Date____________________    Certification From: 96/55/9360  To:1/22/2019

## 2018-10-31 ENCOUNTER — OFFICE VISIT (OUTPATIENT)
Dept: PHYSICAL THERAPY | Age: 68
End: 2018-10-31
Attending: ORTHOPAEDIC SURGERY
Payer: MEDICARE

## 2018-10-31 PROCEDURE — 97110 THERAPEUTIC EXERCISES: CPT

## 2018-10-31 PROCEDURE — 97140 MANUAL THERAPY 1/> REGIONS: CPT

## 2018-10-31 NOTE — PROGRESS NOTES
Dx: R knee meniscectomy on 10/4            Authorized # of Visits:  Alysa Murillo pending         Next MD visit: 15th of November  Fall Risk: standard         Precautions: none             Subjective: Pt [place] walking through airport he was a bit sore- had to leg press single leg level 3 10 reps; level 4 10 reps on the R/L         heel raises on shuttle level 4 double leg 15 reps         Standing heel raises 10 reps         Calf stretch 10 sec hold 5 reps on the R only          Yellow band hip flexion 10 rep

## 2018-11-06 ENCOUNTER — OFFICE VISIT (OUTPATIENT)
Dept: PHYSICAL THERAPY | Age: 68
End: 2018-11-06
Attending: ORTHOPAEDIC SURGERY
Payer: MEDICARE

## 2018-11-06 PROCEDURE — 97110 THERAPEUTIC EXERCISES: CPT

## 2018-11-06 PROCEDURE — 97112 NEUROMUSCULAR REEDUCATION: CPT

## 2018-11-06 NOTE — PROGRESS NOTES
Dx: R knee meniscectomy on 10/4            Authorized # of Visits:  Hung Hammond 8 visits auth  Next MD visit: 15th of November  Fall Risk: standard         Precautions: none             Subjective: Pt reports raking leaves yesterday so it was a bit sore.  He ha SLR 10 reps on R Manual HS stretch         Bridge 10 reps; swiss ball straight leg bridge 10 reps SLR 15 reps on R        sit to stand 5 reps unremarkable Bridge 60 sec hold swiss ball straight leg bridge 60 sec         Step up and over 6 inch *pain step

## 2018-11-08 ENCOUNTER — OFFICE VISIT (OUTPATIENT)
Dept: PHYSICAL THERAPY | Age: 68
End: 2018-11-08
Attending: ORTHOPAEDIC SURGERY
Payer: MEDICARE

## 2018-11-08 PROCEDURE — 97110 THERAPEUTIC EXERCISES: CPT

## 2018-11-08 NOTE — PROGRESS NOTES
Dx: R knee meniscectomy on 10/4            Authorized # of Visits:  Aida Mariano 8 visits auth  Next MD visit: 15th of November  Fall Risk: standard         Precautions: none             Subjective: Pt reports he has been doing exercises and usually test is wal leg bridge 10 reps SLR 15 reps on R X 2# 20 reps        sit to stand 5 reps unremarkable Bridge 60 sec hold swiss ball straight leg bridge 60 sec  X bent knee 30 sec hold x 2 sets; straight leg bridge 30 sec hold x 2 sets       Step up and over 6 inch *maría

## 2018-11-13 ENCOUNTER — OFFICE VISIT (OUTPATIENT)
Dept: PHYSICAL THERAPY | Age: 68
End: 2018-11-13
Attending: ORTHOPAEDIC SURGERY
Payer: MEDICARE

## 2018-11-13 PROCEDURE — 97110 THERAPEUTIC EXERCISES: CPT

## 2018-11-13 NOTE — PROGRESS NOTES
Dx: R knee meniscectomy on 10/4            Authorized # of Visits:  Abigail Giang 8 visits auth  Next MD visit: currently scheduled for Nov 15th   Fall Risk: standard         Precautions: none             Subjective: Pt reports he has been doing better.  \"This t hold 10 reps  X 5 reps  Manual knee extension OP 10 sec hold 10 reps; with HS stretch 10 sec hold 5 reps      SLR 10 reps on R Manual HS stretch  X 30 sec hold x 2 sets Manual HS stretch X 30 sec hold x 2 sets      Bridge 10 reps; swiss ball straight leg b band TKE; 3 way hip with yellow band; heel raises  11/6/2018 quad stretch prone and sidelying hip abduction   11/13/2018 leg press machine in gym, nu step   Charges:  There ex: 2     Total Timed Treatment: 30 min  Total Treatment Time: 35 min

## 2018-11-15 ENCOUNTER — APPOINTMENT (OUTPATIENT)
Dept: PHYSICAL THERAPY | Age: 68
End: 2018-11-15
Attending: ORTHOPAEDIC SURGERY
Payer: MEDICARE

## 2018-11-20 ENCOUNTER — APPOINTMENT (OUTPATIENT)
Dept: PHYSICAL THERAPY | Age: 68
End: 2018-11-20
Attending: ORTHOPAEDIC SURGERY
Payer: MEDICARE

## 2018-12-27 DIAGNOSIS — I10 ESSENTIAL HYPERTENSION: ICD-10-CM

## 2018-12-28 RX ORDER — LISINOPRIL 40 MG/1
TABLET ORAL
Qty: 90 TABLET | Refills: 0 | Status: SHIPPED | OUTPATIENT
Start: 2018-12-28 | End: 2019-03-25

## 2018-12-28 NOTE — TELEPHONE ENCOUNTER
Medication refilled per protocol.      Requested Prescriptions     Signed Prescriptions Disp Refills   • LISINOPRIL 40 MG Oral Tab 90 tablet 0     Sig: TAKE 1 TABLET BY MOUTH ONCE DAILY     Authorizing Provider: Deb Arevalo     Ordering User: Janneth Carrillo

## 2019-01-12 DIAGNOSIS — I10 ESSENTIAL HYPERTENSION: ICD-10-CM

## 2019-01-13 DIAGNOSIS — I10 ESSENTIAL HYPERTENSION: ICD-10-CM

## 2019-01-14 RX ORDER — TRIAMTERENE AND HYDROCHLOROTHIAZIDE 75; 50 MG/1; MG/1
1 TABLET ORAL DAILY
Qty: 90 TABLET | Refills: 0 | Status: SHIPPED | OUTPATIENT
Start: 2019-01-14 | End: 2019-04-10

## 2019-01-14 RX ORDER — TRIAMTERENE AND HYDROCHLOROTHIAZIDE 75; 50 MG/1; MG/1
TABLET ORAL
Qty: 45 TABLET | Refills: 0 | OUTPATIENT
Start: 2019-01-14

## 2019-01-14 NOTE — TELEPHONE ENCOUNTER
Medication refilled per protocol. Requested Prescriptions     Signed Prescriptions Disp Refills   • Triamterene-HCTZ 75-50 MG Oral Tab 90 tablet 0     Sig: Take 1 tablet by mouth daily.      Authorizing Provider: Tete Anderson     Ordering User: Aline Manzano

## 2019-02-04 ENCOUNTER — TELEPHONE (OUTPATIENT)
Dept: FAMILY MEDICINE CLINIC | Facility: CLINIC | Age: 69
End: 2019-02-04

## 2019-02-04 DIAGNOSIS — I10 ESSENTIAL HYPERTENSION, BENIGN: Primary | ICD-10-CM

## 2019-02-04 RX ORDER — METOPROLOL SUCCINATE 50 MG/1
50 TABLET, EXTENDED RELEASE ORAL DAILY
Qty: 90 TABLET | Refills: 0 | Status: SHIPPED | OUTPATIENT
Start: 2019-02-04 | End: 2019-06-04

## 2019-02-04 NOTE — TELEPHONE ENCOUNTER
Fax received from [facility] requesting a refill of Metoprolol.     Refill request for:    Requested Prescriptions     Signed Prescriptions Disp Refills   • Metoprolol Succinate ER 50 MG Oral Tablet 24 Hr 90 tablet 0     Sig: Take 1 tablet (50 mg total) by m

## 2019-03-06 ENCOUNTER — TELEPHONE (OUTPATIENT)
Dept: FAMILY MEDICINE CLINIC | Facility: CLINIC | Age: 69
End: 2019-03-06

## 2019-03-25 DIAGNOSIS — I10 ESSENTIAL HYPERTENSION: ICD-10-CM

## 2019-03-27 NOTE — TELEPHONE ENCOUNTER
Requested Prescriptions     Pending Prescriptions Disp Refills   • LISINOPRIL 40 MG Oral Tab [Pharmacy Med Name: LISINOPRIL 40MG TABLETS] 90 tablet 0     Sig: TAKE 1 TABLET BY MOUTH ONCE DAILY       LOV 9/26/2018     No future appointments.  Lisinopril did

## 2019-03-28 RX ORDER — LISINOPRIL 40 MG/1
TABLET ORAL
Qty: 90 TABLET | Refills: 0 | Status: SHIPPED | OUTPATIENT
Start: 2019-03-28 | End: 2019-07-03

## 2019-04-10 DIAGNOSIS — I10 ESSENTIAL HYPERTENSION: ICD-10-CM

## 2019-04-16 NOTE — TELEPHONE ENCOUNTER
Hypertension Medications Protocol Failed4/10 5:29 PM  - Appointment in past 6 or next 3 months   + CMP or BMP in past 12 months   + Last serum creatinine< 2.0       LOV 9/26/18- pre op   Last labs 9/26/18- BMP for pre op  Next appt 10/16/19      Will you a

## 2019-04-17 RX ORDER — TRIAMTERENE AND HYDROCHLOROTHIAZIDE 75; 50 MG/1; MG/1
1 TABLET ORAL DAILY
Qty: 90 TABLET | Refills: 0 | Status: SHIPPED | OUTPATIENT
Start: 2019-04-17 | End: 2019-09-10

## 2019-04-17 NOTE — TELEPHONE ENCOUNTER
BP med refilled  Yes, he is due for an annual a lot sooner than October. Its been a bit since we've done this. Likely due in summer sometime.

## 2019-05-07 ENCOUNTER — TELEPHONE (OUTPATIENT)
Dept: FAMILY MEDICINE CLINIC | Facility: CLINIC | Age: 69
End: 2019-05-07

## 2019-05-07 DIAGNOSIS — I10 ESSENTIAL HYPERTENSION, BENIGN: ICD-10-CM

## 2019-05-07 DIAGNOSIS — Z13.6 SCREENING FOR CARDIOVASCULAR CONDITION: ICD-10-CM

## 2019-05-07 DIAGNOSIS — E11.49 TYPE II DIABETES MELLITUS WITH NEUROLOGICAL MANIFESTATIONS (HCC): Primary | ICD-10-CM

## 2019-05-07 NOTE — TELEPHONE ENCOUNTER
I called patient to let them know that he is due for a appt and lab work and he stated that he is good and wants to take care of this in October. I did tell him that Dr Desire Linton wanted him to have the blood work now and a appt but he said he was ok.

## 2019-06-04 ENCOUNTER — TELEPHONE (OUTPATIENT)
Dept: FAMILY MEDICINE CLINIC | Facility: CLINIC | Age: 69
End: 2019-06-04

## 2019-06-04 DIAGNOSIS — I10 ESSENTIAL HYPERTENSION, BENIGN: ICD-10-CM

## 2019-06-04 RX ORDER — METOPROLOL SUCCINATE 50 MG/1
TABLET, EXTENDED RELEASE ORAL
Qty: 90 TABLET | Refills: 0 | Status: SHIPPED | OUTPATIENT
Start: 2019-06-04 | End: 2019-08-31

## 2019-06-04 NOTE — TELEPHONE ENCOUNTER
Requested Prescriptions     Pending Prescriptions Disp Refills   • METOPROLOL SUCCINATE ER 50 MG Oral Tablet 24 Hr [Pharmacy Med Name: METOPROLOL ER SUCCINATE 50MG TABS] 90 tablet 0     Sig: TAKE ONE TABLET BY MOUTH DAILY.      LOV 9/26/2018     Appointment

## 2019-06-05 NOTE — TELEPHONE ENCOUNTER
[facility]. Fasting labs have already been ordered by Bryce Oneal and patient is aware as of note 5/07/19.

## 2019-06-24 ENCOUNTER — TELEPHONE (OUTPATIENT)
Dept: FAMILY MEDICINE CLINIC | Facility: CLINIC | Age: 69
End: 2019-06-24

## 2019-06-24 DIAGNOSIS — E11.49 TYPE 2 DIABETES MELLITUS WITH OTHER NEUROLOGIC COMPLICATION, UNSPECIFIED WHETHER LONG TERM INSULIN USE (HCC): Primary | ICD-10-CM

## 2019-06-24 NOTE — TELEPHONE ENCOUNTER
Referral request Dr. Maria D Jaeger M.D.     Patient seen by Dr. Tanisha Luke M.D. 9/26/18  Office notes from Dr. Douglas Leal M.D. 9/26/18   Diabetic peripheral neuropathy associated with type 2 diabetes mellitus ([facility].)

## 2019-07-03 DIAGNOSIS — I10 ESSENTIAL HYPERTENSION: ICD-10-CM

## 2019-07-03 RX ORDER — LISINOPRIL 40 MG/1
TABLET ORAL
Qty: 90 TABLET | Refills: 0 | Status: SHIPPED | OUTPATIENT
Start: 2019-07-03 | End: 2019-09-10

## 2019-08-30 LAB
ALBUMIN/GLOBULIN RATIO: 1.8 (CALC) (ref 1–2.5)
ALBUMIN: 4.3 G/DL (ref 3.6–5.1)
ALKALINE PHOSPHATASE: 51 U/L (ref 40–115)
ALT: 26 U/L (ref 9–46)
AST: 23 U/L (ref 10–35)
BILIRUBIN, TOTAL: 0.8 MG/DL (ref 0.2–1.2)
BUN: 16 MG/DL (ref 7–25)
CALCIUM: 9.6 MG/DL (ref 8.6–10.3)
CARBON DIOXIDE: 25 MMOL/L (ref 20–32)
CHLORIDE: 103 MMOL/L (ref 98–110)
CHOL/HDLC RATIO: 3.7 (CALC)
CHOLESTEROL, TOTAL: 162 MG/DL
CREATININE, RANDOM URINE: 55 MG/DL (ref 20–320)
CREATININE: 1.02 MG/DL (ref 0.7–1.25)
EGFR IF AFRICN AM: 87 ML/MIN/1.73M2
EGFR IF NONAFRICN AM: 75 ML/MIN/1.73M2
GLOBULIN: 2.4 G/DL (CALC) (ref 1.9–3.7)
GLUCOSE: 116 MG/DL (ref 65–99)
HDL CHOLESTEROL: 44 MG/DL
HEMOGLOBIN A1C: 6.5 % OF TOTAL HGB
LDL-CHOLESTEROL: 97 MG/DL (CALC)
MICROALBUMIN/CREATININE RATIO, RANDOM URINE: 35 MCG/MG CREAT
MICROALBUMIN: 1.9 MG/DL
NON-HDL CHOLESTEROL: 118 MG/DL (CALC)
POTASSIUM: 4.2 MMOL/L (ref 3.5–5.3)
PROTEIN, TOTAL: 6.7 G/DL (ref 6.1–8.1)
SODIUM: 138 MMOL/L (ref 135–146)
TRIGLYCERIDES: 116 MG/DL

## 2019-08-31 DIAGNOSIS — I10 ESSENTIAL HYPERTENSION, BENIGN: ICD-10-CM

## 2019-09-03 RX ORDER — METOPROLOL SUCCINATE 50 MG/1
TABLET, EXTENDED RELEASE ORAL
Qty: 30 TABLET | Refills: 0 | Status: SHIPPED | OUTPATIENT
Start: 2019-09-03 | End: 2019-09-10

## 2019-09-03 NOTE — TELEPHONE ENCOUNTER
Metoprolol 50MG    Requested Prescriptions     Pending Prescriptions Disp Refills   • METOPROLOL SUCCINATE ER 50 MG Oral Tablet 24 Hr [Pharmacy Med Name: METOPROLOL ER SUCCINATE 50MG TABS] 90 tablet 0     Sig: TAKE ONE TABLET BY MOUTH DAILY.      LOV 9/26/2

## 2019-09-04 ENCOUNTER — TELEPHONE (OUTPATIENT)
Dept: FAMILY MEDICINE CLINIC | Facility: CLINIC | Age: 69
End: 2019-09-04

## 2019-09-10 ENCOUNTER — OFFICE VISIT (OUTPATIENT)
Dept: FAMILY MEDICINE CLINIC | Facility: CLINIC | Age: 69
End: 2019-09-10
Payer: MEDICARE

## 2019-09-10 VITALS
SYSTOLIC BLOOD PRESSURE: 136 MMHG | BODY MASS INDEX: 36.53 KG/M2 | HEART RATE: 68 BPM | RESPIRATION RATE: 14 BRPM | TEMPERATURE: 98 F | WEIGHT: 241 LBS | DIASTOLIC BLOOD PRESSURE: 82 MMHG | HEIGHT: 68 IN

## 2019-09-10 DIAGNOSIS — I10 ESSENTIAL HYPERTENSION, BENIGN: ICD-10-CM

## 2019-09-10 DIAGNOSIS — Z00.00 ENCOUNTER FOR ANNUAL HEALTH EXAMINATION: Primary | ICD-10-CM

## 2019-09-10 DIAGNOSIS — Z23 NEED FOR VACCINATION: ICD-10-CM

## 2019-09-10 DIAGNOSIS — Z12.11 COLON CANCER SCREENING: ICD-10-CM

## 2019-09-10 DIAGNOSIS — E11.29 MICROALBUMINURIA DUE TO TYPE 2 DIABETES MELLITUS (HCC): ICD-10-CM

## 2019-09-10 DIAGNOSIS — E11.49 TYPE II DIABETES MELLITUS WITH NEUROLOGICAL MANIFESTATIONS (HCC): ICD-10-CM

## 2019-09-10 DIAGNOSIS — E66.01 SEVERE OBESITY WITH BODY MASS INDEX (BMI) OF 35.0 TO 39.9 WITH COMORBIDITY (HCC): ICD-10-CM

## 2019-09-10 DIAGNOSIS — G47.33 OSA ON CPAP: ICD-10-CM

## 2019-09-10 DIAGNOSIS — Z99.89 OSA ON CPAP: ICD-10-CM

## 2019-09-10 DIAGNOSIS — M47.816 ARTHRITIS OF LUMBAR SPINE: ICD-10-CM

## 2019-09-10 DIAGNOSIS — Z85.828 HISTORY OF BASAL CELL CARCINOMA OF EYELID: ICD-10-CM

## 2019-09-10 DIAGNOSIS — R80.9 MICROALBUMINURIA DUE TO TYPE 2 DIABETES MELLITUS (HCC): ICD-10-CM

## 2019-09-10 PROBLEM — Z98.890 S/P ARTHROSCOPIC SURGERY OF RIGHT KNEE: Status: RESOLVED | Noted: 2018-10-18 | Resolved: 2019-09-10

## 2019-09-10 PROCEDURE — 90670 PCV13 VACCINE IM: CPT | Performed by: FAMILY MEDICINE

## 2019-09-10 PROCEDURE — G0009 ADMIN PNEUMOCOCCAL VACCINE: HCPCS | Performed by: FAMILY MEDICINE

## 2019-09-10 PROCEDURE — 99397 PER PM REEVAL EST PAT 65+ YR: CPT | Performed by: FAMILY MEDICINE

## 2019-09-10 PROCEDURE — G0439 PPPS, SUBSEQ VISIT: HCPCS | Performed by: FAMILY MEDICINE

## 2019-09-10 PROCEDURE — 96160 PT-FOCUSED HLTH RISK ASSMT: CPT | Performed by: FAMILY MEDICINE

## 2019-09-10 RX ORDER — LISINOPRIL 40 MG/1
40 TABLET ORAL
Qty: 90 TABLET | Refills: 3 | Status: ON HOLD | OUTPATIENT
Start: 2019-09-10 | End: 2019-10-22

## 2019-09-10 RX ORDER — METOPROLOL SUCCINATE 50 MG/1
50 TABLET, EXTENDED RELEASE ORAL
Qty: 90 TABLET | Refills: 3 | Status: SHIPPED | OUTPATIENT
Start: 2019-09-10 | End: 2020-09-14

## 2019-09-10 RX ORDER — TRIAMTERENE AND HYDROCHLOROTHIAZIDE 75; 50 MG/1; MG/1
1 TABLET ORAL DAILY
Qty: 90 TABLET | Refills: 3 | Status: SHIPPED | OUTPATIENT
Start: 2019-09-10 | End: 2020-08-10

## 2019-09-10 NOTE — PATIENT INSTRUCTIONS
Aaron Rasheed's SCREENING SCHEDULE   Tests on this list are recommended by your physician but may not be covered, or covered at this frequency, by your insurer. Please check with your insurance carrier before scheduling to verify coverage.     PREVENTATI Colonoscopy Screen   Covered every 10 years- more often if abnormal Colonoscopy due on 06/01/2021 Update [facility] [place] if applicable    Flex Sigmoidoscopy Screen  Covered every 5 years No results found for this or any previous visit.  No flowsheet data this or any previous visit. This may be covered with your prescription benefits, but Medicare does not cover unless Medically needed    Zoster (Not covered by Medicare Part B) No orders found for this or any previous visit.  This may be covered with your ph

## 2019-09-10 NOTE — PROGRESS NOTES
HPI:   Evelyn Chapman is a 70year old male who presents for a MA (Medicare Advantage) Supervisit (Once per calendar year). Preventative Screening  Colonoscopy - 6/2018. Repeat 3 yrs? Per patient, need repeat next year.  I can see c-scope 4/2017, advis (if present), and forms available to patient in AVS         He smoked tobacco in the past but quit greater than 12 months ago. Social History    Tobacco Use      Smoking status: Former Smoker        Packs/day: 0.50        Years: 5.00        Pack years: 2. TAKE 1 TABLET BY MOUTH DAILY   ASPIRIN EC LOW DOSE 81 MG Oral Tab EC TAKE 1 TABLET BY MOUTH DAILY   multivitamin with iron Oral Tab Take 1 tablet by mouth daily.       MEDICAL INFORMATION:   He  has a past medical history of Basal cell carcinoma of skin of 9/10/2019  9:51 AM  Screening Method:  Whisper Test  Whisper Test Result:  Pass               Visual Acuity  Right Eye Visual Acuity: Corrected Right Eye Chart Acuity: 20/30   Left Eye Visual Acuity: Corrected Left Eye Chart Acuity: 20/30   Both Eyes Visua Medicare Assessment. PLAN SUMMARY:     Neda Iraheta was seen in the office today:  had concerns including Well Adult (MA Supervisit ).     1. Encounter for annual health examination  Overall fair  c-scope due in the spring, after April. - referral kofi maintain a good energy level?: Daily Walks  How would you describe your daily physical activity?: Moderate  How would you describe your current health state?: Good  How do you maintain positive mental well-being?: Visiting Friends; Visiting Family    This s End-stage renal disease   Hemophiliacs who received Factor VIII or IX concentrates   Clients of institutions for the mentally retarded   Persons who live in the same house as a HepB virus carrier   Homosexual men   Illicit injectable drug abusers     Tet

## 2019-09-15 ENCOUNTER — MA CHART PREP (OUTPATIENT)
Dept: FAMILY MEDICINE CLINIC | Facility: CLINIC | Age: 69
End: 2019-09-15

## 2019-10-01 DIAGNOSIS — I10 ESSENTIAL HYPERTENSION, BENIGN: ICD-10-CM

## 2019-10-01 RX ORDER — METOPROLOL SUCCINATE 50 MG/1
TABLET, EXTENDED RELEASE ORAL
Qty: 90 TABLET | Refills: 0 | OUTPATIENT
Start: 2019-10-01

## 2019-10-14 DIAGNOSIS — I10 ESSENTIAL HYPERTENSION, BENIGN: ICD-10-CM

## 2019-10-15 RX ORDER — METOPROLOL SUCCINATE 50 MG/1
50 TABLET, EXTENDED RELEASE ORAL
Qty: 90 TABLET | Refills: 3 | OUTPATIENT
Start: 2019-10-15

## 2019-10-20 ENCOUNTER — APPOINTMENT (OUTPATIENT)
Dept: CT IMAGING | Facility: HOSPITAL | Age: 69
DRG: 871 | End: 2019-10-20
Attending: EMERGENCY MEDICINE
Payer: MEDICARE

## 2019-10-20 ENCOUNTER — APPOINTMENT (OUTPATIENT)
Dept: GENERAL RADIOLOGY | Facility: HOSPITAL | Age: 69
DRG: 871 | End: 2019-10-20
Attending: EMERGENCY MEDICINE
Payer: MEDICARE

## 2019-10-20 ENCOUNTER — HOSPITAL ENCOUNTER (INPATIENT)
Facility: HOSPITAL | Age: 69
LOS: 2 days | Discharge: HOME | DRG: 871 | End: 2019-10-22
Attending: EMERGENCY MEDICINE | Admitting: HOSPITALIST
Payer: MEDICARE

## 2019-10-20 DIAGNOSIS — K57.20 DIVERTICULITIS OF LARGE INTESTINE WITH PERFORATION WITHOUT BLEEDING: Primary | ICD-10-CM

## 2019-10-20 LAB
ALBUMIN SERPL-MCNC: 3.9 G/DL (ref 3.4–5)
ALBUMIN/GLOB SERPL: 1.1 {RATIO} (ref 1–2)
ALP LIVER SERPL-CCNC: 63 U/L (ref 45–117)
ALT SERPL-CCNC: 27 U/L (ref 16–61)
ANION GAP SERPL CALC-SCNC: 10 MMOL/L (ref 0–18)
AST SERPL-CCNC: 24 U/L (ref 15–37)
BASOPHILS # BLD AUTO: 0.04 X10(3) UL (ref 0–0.2)
BASOPHILS NFR BLD AUTO: 0.2 %
BILIRUB SERPL-MCNC: 1 MG/DL (ref 0.1–2)
BILIRUB UR QL STRIP.AUTO: NEGATIVE
BUN BLD-MCNC: 19 MG/DL (ref 7–18)
BUN/CREAT SERPL: 15.8 (ref 10–20)
CALCIUM BLD-MCNC: 9.2 MG/DL (ref 8.5–10.1)
CHLORIDE SERPL-SCNC: 102 MMOL/L (ref 98–112)
CO2 SERPL-SCNC: 24 MMOL/L (ref 21–32)
CREAT BLD-MCNC: 1.2 MG/DL (ref 0.7–1.3)
DEPRECATED RDW RBC AUTO: 41.2 FL (ref 35.1–46.3)
EOSINOPHIL # BLD AUTO: 0.07 X10(3) UL (ref 0–0.7)
EOSINOPHIL NFR BLD AUTO: 0.4 %
ERYTHROCYTE [DISTWIDTH] IN BLOOD BY AUTOMATED COUNT: 13.2 % (ref 11–15)
GLOBULIN PLAS-MCNC: 3.6 G/DL (ref 2.8–4.4)
GLUCOSE BLD-MCNC: 148 MG/DL (ref 70–99)
GLUCOSE UR STRIP.AUTO-MCNC: NEGATIVE MG/DL
HCT VFR BLD AUTO: 47.7 % (ref 39–53)
HGB BLD-MCNC: 15.5 G/DL (ref 13–17.5)
HYALINE CASTS #/AREA URNS AUTO: PRESENT /LPF
IMM GRANULOCYTES # BLD AUTO: 0.08 X10(3) UL (ref 0–1)
IMM GRANULOCYTES NFR BLD: 0.5 %
KETONES UR STRIP.AUTO-MCNC: 20 MG/DL
LEUKOCYTE ESTERASE UR QL STRIP.AUTO: NEGATIVE
LIPASE SERPL-CCNC: 134 U/L (ref 73–393)
LYMPHOCYTES # BLD AUTO: 1.15 X10(3) UL (ref 1–4)
LYMPHOCYTES NFR BLD AUTO: 7 %
M PROTEIN MFR SERPL ELPH: 7.5 G/DL (ref 6.4–8.2)
MCH RBC QN AUTO: 27.9 PG (ref 26–34)
MCHC RBC AUTO-ENTMCNC: 32.5 G/DL (ref 31–37)
MCV RBC AUTO: 85.8 FL (ref 80–100)
MONOCYTES # BLD AUTO: 0.78 X10(3) UL (ref 0.1–1)
MONOCYTES NFR BLD AUTO: 4.8 %
NEUTROPHILS # BLD AUTO: 14.28 X10 (3) UL (ref 1.5–7.7)
NEUTROPHILS # BLD AUTO: 14.28 X10(3) UL (ref 1.5–7.7)
NEUTROPHILS NFR BLD AUTO: 87.1 %
NITRITE UR QL STRIP.AUTO: NEGATIVE
OSMOLALITY SERPL CALC.SUM OF ELEC: 287 MOSM/KG (ref 275–295)
PH UR STRIP.AUTO: 6 [PH] (ref 4.5–8)
PLATELET # BLD AUTO: 244 10(3)UL (ref 150–450)
POTASSIUM SERPL-SCNC: 4.4 MMOL/L (ref 3.5–5.1)
PROT UR STRIP.AUTO-MCNC: 100 MG/DL
RBC # BLD AUTO: 5.56 X10(6)UL (ref 3.8–5.8)
RBC UR QL AUTO: NEGATIVE
SODIUM SERPL-SCNC: 136 MMOL/L (ref 136–145)
SP GR UR STRIP.AUTO: 1.03 (ref 1–1.03)
UROBILINOGEN UR STRIP.AUTO-MCNC: 2 MG/DL
WBC # BLD AUTO: 16.4 X10(3) UL (ref 4–11)

## 2019-10-20 PROCEDURE — 99223 1ST HOSP IP/OBS HIGH 75: CPT | Performed by: HOSPITALIST

## 2019-10-20 PROCEDURE — 74177 CT ABD & PELVIS W/CONTRAST: CPT | Performed by: EMERGENCY MEDICINE

## 2019-10-20 PROCEDURE — 74019 RADEX ABDOMEN 2 VIEWS: CPT | Performed by: EMERGENCY MEDICINE

## 2019-10-20 PROCEDURE — 99223 1ST HOSP IP/OBS HIGH 75: CPT | Performed by: SURGERY

## 2019-10-20 RX ORDER — METOPROLOL TARTRATE 5 MG/5ML
2.5 INJECTION INTRAVENOUS EVERY 6 HOURS
Status: DISCONTINUED | OUTPATIENT
Start: 2019-10-20 | End: 2019-10-22

## 2019-10-20 RX ORDER — SODIUM PHOSPHATE, DIBASIC AND SODIUM PHOSPHATE, MONOBASIC 7; 19 G/133ML; G/133ML
1 ENEMA RECTAL ONCE AS NEEDED
Status: ON HOLD | COMMUNITY
End: 2019-10-22

## 2019-10-20 RX ORDER — SODIUM CHLORIDE 9 MG/ML
INJECTION, SOLUTION INTRAVENOUS CONTINUOUS
Status: ACTIVE | OUTPATIENT
Start: 2019-10-20 | End: 2019-10-20

## 2019-10-20 RX ORDER — METOCLOPRAMIDE HYDROCHLORIDE 5 MG/ML
5 INJECTION INTRAMUSCULAR; INTRAVENOUS EVERY 8 HOURS PRN
Status: DISCONTINUED | OUTPATIENT
Start: 2019-10-20 | End: 2019-10-22

## 2019-10-20 RX ORDER — ASPIRIN 81 MG/1
81 TABLET ORAL DAILY
COMMUNITY

## 2019-10-20 RX ORDER — MAGNESIUM CARB/ALUMINUM HYDROX 105-160MG
296 TABLET,CHEWABLE ORAL ONCE
Status: ON HOLD | COMMUNITY
End: 2019-10-22

## 2019-10-20 RX ORDER — METRONIDAZOLE 500 MG/100ML
500 INJECTION, SOLUTION INTRAVENOUS ONCE
Status: COMPLETED | OUTPATIENT
Start: 2019-10-20 | End: 2019-10-20

## 2019-10-20 RX ORDER — ONDANSETRON 2 MG/ML
4 INJECTION INTRAMUSCULAR; INTRAVENOUS
Status: DISCONTINUED | OUTPATIENT
Start: 2019-10-20 | End: 2019-10-20

## 2019-10-20 RX ORDER — HYDROMORPHONE HYDROCHLORIDE 1 MG/ML
0.8 INJECTION, SOLUTION INTRAMUSCULAR; INTRAVENOUS; SUBCUTANEOUS EVERY 2 HOUR PRN
Status: DISCONTINUED | OUTPATIENT
Start: 2019-10-20 | End: 2019-10-22

## 2019-10-20 RX ORDER — MULTIVITAMIN WITH FOLIC ACID 400 MCG
1 TABLET ORAL DAILY
COMMUNITY

## 2019-10-20 RX ORDER — KETOROLAC TROMETHAMINE 30 MG/ML
30 INJECTION, SOLUTION INTRAMUSCULAR; INTRAVENOUS EVERY 6 HOURS
Status: DISCONTINUED | OUTPATIENT
Start: 2019-10-20 | End: 2019-10-20 | Stop reason: DRUGHIGH

## 2019-10-20 RX ORDER — ENOXAPARIN SODIUM 100 MG/ML
40 INJECTION SUBCUTANEOUS NIGHTLY
Status: DISCONTINUED | OUTPATIENT
Start: 2019-10-20 | End: 2019-10-22

## 2019-10-20 RX ORDER — SODIUM CHLORIDE 9 MG/ML
INJECTION, SOLUTION INTRAVENOUS CONTINUOUS
Status: DISCONTINUED | OUTPATIENT
Start: 2019-10-20 | End: 2019-10-22

## 2019-10-20 RX ORDER — LEVOFLOXACIN 5 MG/ML
750 INJECTION, SOLUTION INTRAVENOUS EVERY 24 HOURS
Status: DISCONTINUED | OUTPATIENT
Start: 2019-10-21 | End: 2019-10-22

## 2019-10-20 RX ORDER — KETOROLAC TROMETHAMINE 30 MG/ML
15 INJECTION, SOLUTION INTRAMUSCULAR; INTRAVENOUS ONCE
Status: COMPLETED | OUTPATIENT
Start: 2019-10-20 | End: 2019-10-20

## 2019-10-20 RX ORDER — LEVOFLOXACIN 5 MG/ML
750 INJECTION, SOLUTION INTRAVENOUS ONCE
Status: COMPLETED | OUTPATIENT
Start: 2019-10-20 | End: 2019-10-20

## 2019-10-20 RX ORDER — ONDANSETRON 2 MG/ML
4 INJECTION INTRAMUSCULAR; INTRAVENOUS EVERY 6 HOURS PRN
Status: DISCONTINUED | OUTPATIENT
Start: 2019-10-20 | End: 2019-10-22

## 2019-10-20 RX ORDER — KETOROLAC TROMETHAMINE 15 MG/ML
15 INJECTION, SOLUTION INTRAMUSCULAR; INTRAVENOUS EVERY 6 HOURS
Status: COMPLETED | OUTPATIENT
Start: 2019-10-20 | End: 2019-10-22

## 2019-10-20 RX ORDER — ACETAMINOPHEN 325 MG/1
650 TABLET ORAL EVERY 6 HOURS PRN
Status: DISCONTINUED | OUTPATIENT
Start: 2019-10-20 | End: 2019-10-22

## 2019-10-20 RX ORDER — METRONIDAZOLE 500 MG/100ML
500 INJECTION, SOLUTION INTRAVENOUS EVERY 8 HOURS
Status: DISCONTINUED | OUTPATIENT
Start: 2019-10-20 | End: 2019-10-22

## 2019-10-20 RX ORDER — ONDANSETRON 2 MG/ML
4 INJECTION INTRAMUSCULAR; INTRAVENOUS EVERY 4 HOURS PRN
Status: DISCONTINUED | OUTPATIENT
Start: 2019-10-20 | End: 2019-10-20

## 2019-10-20 RX ORDER — HYDROMORPHONE HYDROCHLORIDE 1 MG/ML
0.2 INJECTION, SOLUTION INTRAMUSCULAR; INTRAVENOUS; SUBCUTANEOUS EVERY 2 HOUR PRN
Status: DISCONTINUED | OUTPATIENT
Start: 2019-10-20 | End: 2019-10-22

## 2019-10-20 RX ORDER — SODIUM CHLORIDE, SODIUM LACTATE, POTASSIUM CHLORIDE, CALCIUM CHLORIDE 600; 310; 30; 20 MG/100ML; MG/100ML; MG/100ML; MG/100ML
INJECTION, SOLUTION INTRAVENOUS CONTINUOUS
Status: DISCONTINUED | OUTPATIENT
Start: 2019-10-20 | End: 2019-10-22

## 2019-10-20 RX ORDER — HYDROMORPHONE HYDROCHLORIDE 1 MG/ML
0.4 INJECTION, SOLUTION INTRAMUSCULAR; INTRAVENOUS; SUBCUTANEOUS EVERY 2 HOUR PRN
Status: DISCONTINUED | OUTPATIENT
Start: 2019-10-20 | End: 2019-10-22

## 2019-10-20 NOTE — PROGRESS NOTES
[facility] Pharmacy Note:  Age Based Dose Adjustment    Janel Mendenhall has been prescribed ketorolac (TORADOL) 15 mg OR 30 mg IV every 6 hours. Patient is >71 years old therefore the 30 mg dose was discontinued.       Thank you,  Delia Yanez, PharmD  10/20/2019 5

## 2019-10-20 NOTE — ED INITIAL ASSESSMENT (HPI)
C/o constant cramping pain across lower abd,  Worse on lt since this am,   intermitent for days prior,   Nausea, no vomiting or diarrhea   Recently in [place],  No bm for 2 weeks.

## 2019-10-20 NOTE — CONSULTS
[facility]  Report of Consultation     Patient Status:  Inpatient    1950 MRN [id number]   [facility] 3NW-A Attending Larissa Samuel MD   [facility] Day # 0 PCP Duran Vila MD     Reason for Consultation:  Marly Singleton appropriate for patient age. BONES:  Mild levoscoliosis of the lumbar spine. LUNG BASES:  Reticular nodular densities throughout the periphery of the lung bases is noted.   OTHER:  Negative.       =====  CONCLUSION:       Acute diverticulitis along the at [facility] MAIN OR   • REMOVAL OF KIDNEY STONE  1980   • SKIN SURGERY  8-19-15    Mohs surgery for [facility] to left lower eyelid     Family History   Problem Relation Age of Onset   • Heart Disorder Father    • Cancer Maternal Grandmother    • Arthritis Mother    • MG/ML injection 0.8 mg, 0.8 mg, Intravenous, Q2H PRN    Review of Systems:  Pertinent items are noted in HPI. Physical Exam:  Blood pressure 126/69, pulse 91, temperature 99.3 °F (37.4 °C), temperature source Oral, resp.  rate 16, height 68\", weight 230 surgical intervention required at this time. · All questions answered. Wife at bedside. Case d/w RN. I spent 45 minutes face to face with the patient. More than 50% of that time was spent counseling the patient and/or on coordination of care.  The jody

## 2019-10-20 NOTE — H&P
[facility]  History and Physical     [address] Patient Status:  Emergency    1950 MRN [id number]   Location [address] Attending No att. providers found   Hosp Day # 0 PCP Shannon Ann MD     Chief Compla [address] [facility], DO at [facility] ENDOSCOPY   • FLEXIBLE SIGMOIDOSCOPY N/A 10/12/2016    Performed by Felton Olson DO at [address] GRAFT Left 8/26/2015    Performed by Faby Humphrey MD at [address] [place] [address] EOM-[facility]. Anicteric. Neck: No lymphadenopathy. No JVD. No carotid bruits. Respiratory: Clear to auscultation bilaterally. No wheezes. No rhonchi. Cardiovascular: S1, S2. Regular rate and rhythm. No murmurs, no rubs or gallops. Equal pulses.    Chest

## 2019-10-20 NOTE — ED PROVIDER NOTES
Patient Seen in: [facility] Emergency Department      History   Patient presents with:  Abdomen/Flank Pain (GI/)    Stated Complaint: abdominal pain    HPI    Patient was in [facility] last week. On Tuesday, October 8 he had surfing [place].   That night ENDOSCOPY   • COLONOSCOPY N/A 9/23/2016    Performed by Shikha Edwards DO at [address] [facility] ENDOSCOPY   • ESOPHAGOGASTRODUODENOSCOPY (EGD) N/A 9/23/2016    Performed by Shikha Edwards DO at [facility] N/A 10/12/2016    Performed by VIDAL with some more prominent fullness here. Rectal examination reveals no fecal impaction. There is soft brown stool  Extremities: Unremarkable. Calves nonswollen, symmetric  Neurologic:  Mental status as above.   Patient moves all extremities with good stre significantly elevated white count with a strong predominance of neutrophils on differential.  Metabolic panel shows normal electrolytes. Creatinine 1.2.   LFTs normal  Urinalysis shows high specific gravity with ketones consistent with dehydration observe

## 2019-10-20 NOTE — CONSULTS
[facility] Pharmacy Note:  Renal Adjustment for levofloxacin (Angel Abel)    Hawa Bell is a 78year old male who has been prescribed levofloxacin (LEVAQUIN) 500 mg X1 dose. CrCl is estimated creatinine clearance is 55.4 mL/min (based on SCr of 1.2 mg/dL).  so

## 2019-10-21 LAB
ANION GAP SERPL CALC-SCNC: 9 MMOL/L (ref 0–18)
APTT PPP: 30.7 SECONDS (ref 25.4–36.1)
BASOPHILS # BLD AUTO: 0.03 X10(3) UL (ref 0–0.2)
BASOPHILS NFR BLD AUTO: 0.2 %
BUN BLD-MCNC: 30 MG/DL (ref 7–18)
BUN/CREAT SERPL: 19.7 (ref 10–20)
CALCIUM BLD-MCNC: 8.4 MG/DL (ref 8.5–10.1)
CHLORIDE SERPL-SCNC: 105 MMOL/L (ref 98–112)
CO2 SERPL-SCNC: 25 MMOL/L (ref 21–32)
CREAT BLD-MCNC: 1.52 MG/DL (ref 0.7–1.3)
DEPRECATED RDW RBC AUTO: 43.1 FL (ref 35.1–46.3)
EOSINOPHIL # BLD AUTO: 0 X10(3) UL (ref 0–0.7)
EOSINOPHIL NFR BLD AUTO: 0 %
ERYTHROCYTE [DISTWIDTH] IN BLOOD BY AUTOMATED COUNT: 13.6 % (ref 11–15)
GLUCOSE BLD-MCNC: 117 MG/DL (ref 70–99)
HCT VFR BLD AUTO: 37.9 % (ref 39–53)
HGB BLD-MCNC: 12.3 G/DL (ref 13–17.5)
IMM GRANULOCYTES # BLD AUTO: 0.09 X10(3) UL (ref 0–1)
IMM GRANULOCYTES NFR BLD: 0.5 %
INR BLD: 1.06 (ref 0.9–1.1)
LACTATE SERPL-SCNC: 2.2 MMOL/L (ref 0.4–2)
LACTATE SERPL-SCNC: 3.3 MMOL/L (ref 0.4–2)
LACTATE SERPL-SCNC: 3.5 MMOL/L (ref 0.4–2)
LYMPHOCYTES # BLD AUTO: 0.83 X10(3) UL (ref 1–4)
LYMPHOCYTES NFR BLD AUTO: 5 %
MCH RBC QN AUTO: 28.3 PG (ref 26–34)
MCHC RBC AUTO-ENTMCNC: 32.5 G/DL (ref 31–37)
MCV RBC AUTO: 87.1 FL (ref 80–100)
MONOCYTES # BLD AUTO: 0.88 X10(3) UL (ref 0.1–1)
MONOCYTES NFR BLD AUTO: 5.3 %
NEUTROPHILS # BLD AUTO: 14.68 X10 (3) UL (ref 1.5–7.7)
NEUTROPHILS # BLD AUTO: 14.68 X10(3) UL (ref 1.5–7.7)
NEUTROPHILS NFR BLD AUTO: 89 %
OSMOLALITY SERPL CALC.SUM OF ELEC: 295 MOSM/KG (ref 275–295)
PLATELET # BLD AUTO: 172 10(3)UL (ref 150–450)
POTASSIUM SERPL-SCNC: 4.2 MMOL/L (ref 3.5–5.1)
PSA SERPL DL<=0.01 NG/ML-MCNC: 14.3 SECONDS (ref 12.5–14.7)
RBC # BLD AUTO: 4.35 X10(6)UL (ref 3.8–5.8)
SODIUM SERPL-SCNC: 139 MMOL/L (ref 136–145)
WBC # BLD AUTO: 16.5 X10(3) UL (ref 4–11)

## 2019-10-21 PROCEDURE — 99232 SBSQ HOSP IP/OBS MODERATE 35: CPT | Performed by: SURGERY

## 2019-10-21 PROCEDURE — 99233 SBSQ HOSP IP/OBS HIGH 50: CPT | Performed by: HOSPITALIST

## 2019-10-21 PROCEDURE — 5A09357 ASSISTANCE WITH RESPIRATORY VENTILATION, LESS THAN 24 CONSECUTIVE HOURS, CONTINUOUS POSITIVE AIRWAY PRESSURE: ICD-10-PCS | Performed by: HOSPITALIST

## 2019-10-21 RX ORDER — ACETAMINOPHEN 650 MG/1
650 SUPPOSITORY RECTAL EVERY 4 HOURS PRN
Status: DISCONTINUED | OUTPATIENT
Start: 2019-10-21 | End: 2019-10-22

## 2019-10-21 NOTE — PAYOR COMM NOTE
--------------  ADMISSION REVIEW     Payor: Mia Carroll  Subscriber #:  [id number]  Authorization Number: [id number]    Admit date: 10/20/19  Admit time: 46       Admitting Physician: Neelima Miller MD  Attending Physician:  Sofia Raines MD  Pr intolerance (pre-diabetes)    • High blood pressure    • High cholesterol    • Hyperlipidemia    • Impaired fasting glucose 6/29/2012   • Nephrolithiasis    • Neuropathy     feet   • Peripheral neuropathy, idiopathic 1/14/2013   • Sleep apnea    • Visual i Supple  Lungs: Clear to auscultation bilaterally. No rhonchi or rales. Heart: Normal S1 and S2, without murmur or rub. Distal pulses are strong and symmetric. Abdomen: Distended and somewhat firm.   Diffusely tender especially in the left mid abdomen wi and leukocytes    Obstructive series  Admission disposition: 10/20/2019  1:26 PM   FINDINGS:    BOWEL GAS PATTERN:  Minimally distended air-filled loops of bowel throughout the abdomen are noted. CALCIFICATIONS:  None significant.     CT abdomen pelvis  CO He started to have abdominal pain with nausea and vomiting. He thought it was the food that he had eaten. He started to feel better but then became constipated. He took some laxatives without much improvement.   He started to have increasing left sided bilaterally. No wheezes. No rhonchi. Cardiovascular: S1, S2. Regular rate and rhythm. No murmurs, no rubs or gallops. Equal pulses. Chest and Back: No tenderness or deformity. Abdomen: Soft, TTT LLQ, nondistended. Positive bowel sounds.  No rebound, gu failed to improve, or this becomes a recurrent issue. · No acute surgical intervention required at this time. · All questions answered. Wife at bedside. Case d/w RN.   Viry Rosas MD  10/20/2019  3:22 PM    MEDICATIONS ADMINISTERED IN LAST 1 DAY:  ac 10/20/2019 1235 New Bag (none) Intravenous Pam Gonzalez RN      0.9% NaCl infusion     Date Action Dose Route User    10/20/2019 1515 Restarted (none) Intravenous Brisa Wilder RN          REVIEWER COMMENTS:     FOR REVIEW/APPROVAL OF INPT A

## 2019-10-21 NOTE — PROGRESS NOTES
[facility]  Progress Note     [address] Patient Status:  Inpatient    1950 MRN [id number]   [facility] 3NW-A Attending Babatunde Simons MD   [facility] Day # 1 PCP Clarke Finley MD     Chief Complaint: diverticulitis    S: Jose Manuel Cano RENNY  1. IVF  2. BCX  2. Diverticulitis  1. Levaquin/flagyl  2. Pain control  3. Intra-abd perforation  1. Due to above  2. ABX  3. IVF  4. Surgery eval  5. Diet per surergy  4. RENNY  1. Likely ATN from sepsis  2. IVF  3. monitor  5. HTN  1.  Hold home meds

## 2019-10-21 NOTE — PLAN OF CARE
Admitted from ED  Admission database completed  DR Atif Edge at bedside    At 6:30pm patient noted to be flushed; T 101.9 F; VSS; /59 HR 99  DR Atif Edge notified; orders for 2 set of blood cx; will monitor

## 2019-10-21 NOTE — RESPIRATORY THERAPY NOTE
FRED : EQUIPMENT USE: DAILY SUMMARY                                            SET MODE: AUTO CPAP WITH CFLEX                                          USAGE IN HOURS:8:36                                          90%

## 2019-10-21 NOTE — PROGRESS NOTES
[facility]  Progress Note    [address] Patient Status:  Inpatient    1950 MRN [id number]   [facility] 3NW-A Attending Deni Waller MD   [facility] Day # 1 PCP Oneida Zapien MD     Subjective:  No new complaints.  4/10 LLQ pain intestine with perforation without bleeding      Acute diverticulitis    Plan:  1. Cont abx  2. Clear liquid diet  3. All questions answered  4. Ambulate, DVT prophylaxis    My total face time with this patient was 23 minutes.   Greater than half of our vis

## 2019-10-22 VITALS
HEART RATE: 79 BPM | BODY MASS INDEX: 37.18 KG/M2 | HEIGHT: 68 IN | RESPIRATION RATE: 18 BRPM | DIASTOLIC BLOOD PRESSURE: 53 MMHG | OXYGEN SATURATION: 98 % | TEMPERATURE: 99 F | SYSTOLIC BLOOD PRESSURE: 114 MMHG | WEIGHT: 245.31 LBS

## 2019-10-22 LAB
ALBUMIN SERPL-MCNC: 2.3 G/DL (ref 3.4–5)
ALBUMIN/GLOB SERPL: 0.7 {RATIO} (ref 1–2)
ALP LIVER SERPL-CCNC: 36 U/L (ref 45–117)
ALT SERPL-CCNC: 12 U/L (ref 16–61)
ANION GAP SERPL CALC-SCNC: 7 MMOL/L (ref 0–18)
AST SERPL-CCNC: 13 U/L (ref 15–37)
BILIRUB SERPL-MCNC: 0.6 MG/DL (ref 0.1–2)
BUN BLD-MCNC: 33 MG/DL (ref 7–18)
BUN/CREAT SERPL: 23.7 (ref 10–20)
CALCIUM BLD-MCNC: 8.4 MG/DL (ref 8.5–10.1)
CHLORIDE SERPL-SCNC: 104 MMOL/L (ref 98–112)
CO2 SERPL-SCNC: 24 MMOL/L (ref 21–32)
CREAT BLD-MCNC: 1.39 MG/DL (ref 0.7–1.3)
GLOBULIN PLAS-MCNC: 3.2 G/DL (ref 2.8–4.4)
GLUCOSE BLD-MCNC: 130 MG/DL (ref 70–99)
M PROTEIN MFR SERPL ELPH: 5.5 G/DL (ref 6.4–8.2)
OSMOLALITY SERPL CALC.SUM OF ELEC: 289 MOSM/KG (ref 275–295)
POTASSIUM SERPL-SCNC: 3.7 MMOL/L (ref 3.5–5.1)
SODIUM SERPL-SCNC: 135 MMOL/L (ref 136–145)

## 2019-10-22 PROCEDURE — 99239 HOSP IP/OBS DSCHRG MGMT >30: CPT | Performed by: HOSPITALIST

## 2019-10-22 RX ORDER — METRONIDAZOLE 500 MG/1
500 TABLET ORAL 3 TIMES DAILY
Qty: 30 TABLET | Refills: 0 | Status: SHIPPED | OUTPATIENT
Start: 2019-10-22 | End: 2019-11-01

## 2019-10-22 RX ORDER — POTASSIUM CHLORIDE 20 MEQ/1
40 TABLET, EXTENDED RELEASE ORAL ONCE
Status: COMPLETED | OUTPATIENT
Start: 2019-10-22 | End: 2019-10-22

## 2019-10-22 RX ORDER — LEVOFLOXACIN 750 MG/1
750 TABLET ORAL DAILY
Qty: 10 TABLET | Refills: 0 | Status: SHIPPED | OUTPATIENT
Start: 2019-10-22 | End: 2019-10-29

## 2019-10-22 NOTE — PLAN OF CARE
Problem: Patient/Family Goals  Goal: Patient/Family Long Term Goal  Description  Patient's Long Term Goal: DISCHARGE HOME    Interventions:  - PAIN TOLERABLE  -TOLERATING DIET  -RETURN TO PREVIOUS ADL'S  - See additional Care Plan goals for specific inte limitations  - Instruct pt to call for assistance with activity based on assessment  - Modify environment to reduce risk of injury  - Provide assistive devices as appropriate  - Consider OT/PT consult to assist with strengthening/mobility  - Encourage toil Obtain nutritional consult as needed  - Establish a toileting routine/schedule  - Consider collaborating with pharmacy to review patient's medication profile  Outcome: Progressing  Goal: Maintains adequate nutritional intake (undernourished)  Description

## 2019-10-22 NOTE — PLAN OF CARE
Problem: PAIN - ADULT  Goal: Verbalizes/displays adequate comfort level or patient's stated pain goal  Description  INTERVENTIONS:  - Encourage pt to monitor pain and request assistance  - Assess pain using appropriate pain scale  - Administer analgesics b appropriate  - Identify discharge learning needs (meds, wound care, etc)  - Arrange for interpreters to assist at discharge as needed  - Consider post-discharge preferences of patient/family/discharge partner  - Complete POLST form as appropriate  - Assess environment  Outcome: Progressing      Pt is A&O, VSS, with mild c/o pain to abdomen. Pt is on RA with bilateral clear diminished lung sounds, . FRED with CPAP. NSR on telemetry. Abdomen is soft and tender with active bowel sounds. Refusing SCDs.  Freya Main

## 2019-10-22 NOTE — PROGRESS NOTES
DR Gina Lemus NOTIFIED ABOUT PT'S NON PRODUCTIVE COUGH GETTING WORST. HEART RATE MAINTAIN IN 'S. LACTIC ACID RESULTS, AND URINE OUTPUT AND COLOR.  ALSO INFORMED DR Gina Lemus THAT PT DENIES ANY CHEST PAIN, SHORTNESS OF BREATH, AND STILL ON RA SATTING IN MID

## 2019-10-22 NOTE — PROGRESS NOTES
[facility]  Progress Note     [address] Patient Status:  Inpatient    1950 MRN [id number]   [facility] 3NW-A Attending Jonny Rosenberg MD   [address] Day # 2 PCP Michiel Ormond, MD     Chief Complaint: diverticulitis    S: Dev Quintana ASSESSMENT / PLAN:     1. Severe sepsis with RENNY  1. resolving  2. Diverticulitis  1. Levaquin/flagyl  2. Pain control  3. Intra-abd perforation  1. Due to above  2. ABX  3. IVF  4. Surgery following  5. Diet per surergy  4. RENNY  1.  Likely ATN from s

## 2019-10-22 NOTE — PROGRESS NOTES
[facility]  Progress Note    [address] Patient Status:  Inpatient    1950 MRN [id number]   [facility] 3NW-A Attending Patrecia Cooks, MD   [facility] Day # 2 PCP Jairon Arredondo MD     Subjective:  Feels good. Minimal/no pain.   Lizzy Malagon medically cleared. Follow-up in 1-2 weeks. 5. All questions answered.       Daniel Ellis MD  10/22/2019  6:55 AM

## 2019-10-22 NOTE — RESPIRATORY THERAPY NOTE
FRED - Equipment Use Daily Summary:                  . Set Mode: AUTO CPAP WITH C-FLEX                . Usage in hours: 9:24                . 90% Pressure (EPAP) level: 11.5                . 90% Insp. Pressure (IPAP): Luis Rodriguez  AHI: 7.0

## 2019-10-22 NOTE — CM/SW NOTE
Patient was screened during rounds and no needs are identified at this time. RN to contact SW/CM if needs arise.     Brandan Osborne RN, [facility]    [phone]

## 2019-10-23 NOTE — PAYOR COMM NOTE
--------------  DISCHARGE REVIEW    Payor: Lizzie Marcin #:  [id number]  Authorization Number: [id number]    Admit date: 10/20/19  Admit time:  7466  Discharge Date: 10/22/2019  4:24 PM     Admitting Physician: Inocente Homans, MD  Attending EMERGENCY ROOM FOR ANY RETURN OR WORSENING SYMPTOMS. INSTRUCTED PATIENT TO SCHEDULE A FOLLOW UP APPOINTMENT WITH HIS PCP IN 1 WEEK AND WITH DR. MCKAY IN 2 WEEKS.  INSTRUCTED PATIENT TO CHECK HIS BLOOD PRESSURE DAILY AND RECORD RESULTS-HOLD HIS LISINOPRIL AN

## 2019-10-23 NOTE — DISCHARGE SUMMARY
[facility] [place] [facility]  DISCHARGE SUMMARY      Patient Status:  Inpatient    1950 MRN [id number]   [facility] 3NW-A Attending Laura Sommers MD   Hosp Day # 2 PCP Shannon Ann MD     Date of Admission: 10/20/2019  Date of hospital.    Consultants:  • surgery    Discharge Medication List:     Discharge Medications      START taking these medications      Instructions Prescription details   levofloxacin 750 MG Tabs  Commonly known as:  LEVAQUIN  Notes to patient:  DO NOT TAKE for a visit      Appointments for Next 30 Days 10/23/2019 - 11/22/2019      Date Arrival Time Visit Type Length Department Provider     10/28/2019 11:00 AM  HOSPITAL/SNF F/U [3000] 30 min.  Danny 26, [id number], [place],

## 2019-10-24 ENCOUNTER — PATIENT OUTREACH (OUTPATIENT)
Dept: CASE MANAGEMENT | Age: 69
End: 2019-10-24

## 2019-10-24 DIAGNOSIS — K57.20 DIVERTICULITIS OF LARGE INTESTINE WITH PERFORATION WITHOUT BLEEDING: ICD-10-CM

## 2019-10-24 DIAGNOSIS — A41.9 SEVERE SEPSIS (HCC): ICD-10-CM

## 2019-10-24 DIAGNOSIS — R65.20 SEVERE SEPSIS (HCC): ICD-10-CM

## 2019-10-24 DIAGNOSIS — Z02.9 ENCOUNTERS FOR UNSPECIFIED ADMINISTRATIVE PURPOSE: ICD-10-CM

## 2019-10-24 PROCEDURE — 1111F DSCHRG MED/CURRENT MED MERGE: CPT

## 2019-10-24 NOTE — PROGRESS NOTES
Initial Post Discharge Follow Up   Discharge Date: 10/22/19  Contact Date: 10/24/2019    Consent Verification:  Assessment Completed With: Patient  HIPAA Verified? Yes    Discharge Dx:     1. Severe sepsis  2. Diverticulitis  3.  Intra-abd perforation any pain medication, Tylenol for temperature of 100.4  o Aggravating factors: no  o Is the pain manageable at home?  yes  • When you were leaving the hospital were your discharge instructions reviewed with you? yes  • How well were your discharge instructio with you to make sure we are not missing anything? yes  • Are there any reasons that keep you from taking your medication as prescribed? No  Are you having any concerns with constipation? No    Referrals/orders at D/C:  Home Health/Services ordered at D/C? medications, discharge instructions, S&S of infection/blood clots, worsening symptoms of diverticulitis, blood pressure changes, NCM instructed patient to report any new or worsening symptoms, when to call the doctor and when to call 911.  Patient verbalize

## 2019-10-25 ENCOUNTER — OFFICE VISIT (OUTPATIENT)
Dept: FAMILY MEDICINE CLINIC | Facility: CLINIC | Age: 69
End: 2019-10-25
Payer: MEDICARE

## 2019-10-25 ENCOUNTER — HOSPITAL ENCOUNTER (OUTPATIENT)
Dept: GENERAL RADIOLOGY | Age: 69
Discharge: HOME | End: 2019-10-25
Attending: FAMILY MEDICINE
Payer: MEDICARE

## 2019-10-25 VITALS
HEIGHT: 66.93 IN | RESPIRATION RATE: 18 BRPM | SYSTOLIC BLOOD PRESSURE: 118 MMHG | TEMPERATURE: 98 F | DIASTOLIC BLOOD PRESSURE: 78 MMHG | WEIGHT: 243 LBS | HEART RATE: 84 BPM | BODY MASS INDEX: 38.14 KG/M2

## 2019-10-25 DIAGNOSIS — A41.9 SEVERE SEPSIS (HCC): ICD-10-CM

## 2019-10-25 DIAGNOSIS — R65.20 SEVERE SEPSIS (HCC): ICD-10-CM

## 2019-10-25 DIAGNOSIS — I10 ESSENTIAL HYPERTENSION, BENIGN: ICD-10-CM

## 2019-10-25 DIAGNOSIS — K57.20 DIVERTICULITIS OF LARGE INTESTINE WITH PERFORATION WITHOUT BLEEDING: Primary | ICD-10-CM

## 2019-10-25 DIAGNOSIS — N17.9 ACUTE KIDNEY INJURY (HCC): ICD-10-CM

## 2019-10-25 DIAGNOSIS — R05.9 COUGH: ICD-10-CM

## 2019-10-25 PROCEDURE — 71046 X-RAY EXAM CHEST 2 VIEWS: CPT | Performed by: FAMILY MEDICINE

## 2019-10-25 PROCEDURE — 99496 TRANSJ CARE MGMT HIGH F2F 7D: CPT | Performed by: FAMILY MEDICINE

## 2019-10-25 NOTE — PROGRESS NOTES
Patient presents with:  Er F/u: Was in the ER, still not feeling well     HPI:    Shiva Arroyo is a 78year old male here today for hospital follow up.    He was discharged from Inpatient hospital, [facility] to Home   Admission Date: 10/20/19   Disc Allergies:  He is allergic to soybean allergy; morphine; and penicillins. Current Meds:  levofloxacin 750 MG Oral Tab, Take 1 tablet (750 mg total) by mouth daily for 10 days.   metRONIDAZOLE 500 MG Oral Tab, Take 1 tablet (500 mg total) by mouth 3 ( ROS:   GENERAL: weight stable, energy stable, no sweating  SKIN: denies any unusual skin lesions  EYES: denies blurred vision or double vision  HEENT: denies nasal congestion, sinus pain or ST  LUNGS: denies shortness of breath with exertion  CARDIOVAS x10(6)uL 4.35 5.56 4.64   Hemoglobin      13.0 - 17.5 g/dL 12.3 (L) 15.5 13.5   Hematocrit      39.0 - 53.0 % 37.9 (L) 47.7 39.9   Platelet Count      285.9 - 450.0 10(3)uL 172.0 244.0 180.0   MCV      80.0 - 100.0 fL 87.1 85.8 86.0     Blood Cx: negative

## 2019-10-29 ENCOUNTER — TELEPHONE (OUTPATIENT)
Dept: FAMILY MEDICINE CLINIC | Facility: CLINIC | Age: 69
End: 2019-10-29

## 2019-10-29 NOTE — TELEPHONE ENCOUNTER
Patient called requesting to speak with nurse states has been having side effects to the Levoquin and wants to know if Dr Shawn Allison would prescribe something else.  States is having pain in back of leg and read that was one of the side effects

## 2019-10-29 NOTE — TELEPHONE ENCOUNTER
Started taking Levaquin appx 9 days ago. Noticed a couple of days ago that he started having a cough soon after starting the medication and now has tendon pain on the right leg above the Achilles tendon.     Concerned with that this is a side effect and wou

## 2019-10-29 NOTE — TELEPHONE ENCOUNTER
Go ahead and stop the Levaquin at this time. Continue the flagyl. Can irritate tendons. Careful with any heavy lifting right now. Try to protect that area.

## 2019-10-29 NOTE — TELEPHONE ENCOUNTER
Spoke with [employer] telling him to stop the Levaquin but to continue with Flagyl per Jovita Primayela, and to be careful with heavy lifting right now to try and protect the area of concern. Chris verbalized understanding.

## 2019-11-07 ENCOUNTER — OFFICE VISIT (OUTPATIENT)
Dept: SURGERY | Facility: CLINIC | Age: 69
End: 2019-11-07
Payer: MEDICARE

## 2019-11-07 VITALS — TEMPERATURE: 99 F | SYSTOLIC BLOOD PRESSURE: 134 MMHG | HEART RATE: 84 BPM | DIASTOLIC BLOOD PRESSURE: 78 MMHG

## 2019-11-07 DIAGNOSIS — I10 BENIGN ESSENTIAL HTN: ICD-10-CM

## 2019-11-07 DIAGNOSIS — K57.20 DIVERTICULITIS OF LARGE INTESTINE WITH PERFORATION WITHOUT BLEEDING: Primary | ICD-10-CM

## 2019-11-07 DIAGNOSIS — G47.33 OSA ON CPAP: ICD-10-CM

## 2019-11-07 DIAGNOSIS — E66.01 SEVERE OBESITY WITH BODY MASS INDEX (BMI) OF 35.0 TO 39.9 WITH COMORBIDITY (HCC): ICD-10-CM

## 2019-11-07 DIAGNOSIS — E11.49 TYPE II DIABETES MELLITUS WITH NEUROLOGICAL MANIFESTATIONS (HCC): ICD-10-CM

## 2019-11-07 DIAGNOSIS — Z99.89 OSA ON CPAP: ICD-10-CM

## 2019-11-07 PROCEDURE — 99215 OFFICE O/P EST HI 40 MIN: CPT | Performed by: COLON & RECTAL SURGERY

## 2019-11-07 RX ORDER — LISINOPRIL 40 MG/1
40 TABLET ORAL DAILY
COMMUNITY
End: 2020-02-28

## 2019-11-08 NOTE — H&P
New Patient Visit Note       Active Problems      1. Diverticulitis of large intestine with perforation without bleeding    2. Severe obesity with body mass index (BMI) of 35.0 to 39.9 with comorbidity ([facility] [place] [address]    3.  Type II diabetes mellitus with neurologica his first episode ever of diverticulitis. He is uncertain whether or not he has had other similar attacks to diverticulitis. None of been to the magnitude of what has happened in the last few weeks. He is tolerating a diet.   He points to the lower abd which includes the Dose Index Registry. PATIENT STATED HISTORY:(As transcribed by Technologist)  Patient complains of right and left lower quadrant pain and nausea.       CONTRAST USED:  100cc of Omnipaque 350     FINDINGS:    LIVER:  Fatty infiltration Allergies  Preethi Bennett is allergic to soybean allergy; morphine; and penicillins. Past Medical / Surgical / Social / Family History    The past medical and past surgical history have been reviewed by me today.     Past Medical History:   Diagnosis D Socioeconomic History      Marital status:       Spouse name: Not on file      Number of children: Not on file      Years of education: Not on file      Highest education level: Not on file    Occupational History      Occupation: Retired    Tobacco stool, constipation, diarrhea, nausea and vomiting. Genitourinary: Negative for difficulty urinating, dysuria, frequency and urgency. Musculoskeletal: Negative for arthralgias and myalgias. Skin: Negative for color change and rash.    Neurological: Ne present. Is a very long left flank incision. The patient is obese. Bowel sounds have normal activity and normal pitch. Liver and spleen are not palpable. He is specifically not tender to deep palpation of the left lower quadrant or suprapubic regions. has a slight cough. He currently has temperatures in the 99's. He has [place] no abdominal pain. He currently denies any nausea or vomiting. He has no current constipation or diarrhea.     The patient has had a previous kidney stone extracted surgicall appears in the distal descending colon. Please see the radiologist reading for all other findings not related to this portion of his intestine. I have a slight preference to perform elective surgery on him.   This is especially in light of the fact that

## 2019-11-08 NOTE — PATIENT INSTRUCTIONS
I am seeing this patient in semiurgent consultation regarding her recent episode of diverticulitis. This patient got very sick in [facility] while he was visiting between October 5, 2019, and October 12, 2019. He states that he had cramps.   He had pain acr No inguinal or umbilical hernias are present. Is a very long left flank incision. The patient is obese. Bowel sounds have normal activity and normal pitch. Liver and spleen are not palpable.   He is specifically not tender to deep palpation of the left point in his future in which he feels that his diverticulitis is present. His best opportunity to avoid extensive surgery, colostomy, is to call me immediately.   Even with due diligence, he is at risk for perforation which may require emergency surgical i

## 2019-12-04 ENCOUNTER — OFFICE VISIT (OUTPATIENT)
Dept: FAMILY MEDICINE CLINIC | Facility: CLINIC | Age: 69
End: 2019-12-04
Payer: MEDICARE

## 2019-12-04 VITALS
DIASTOLIC BLOOD PRESSURE: 80 MMHG | HEART RATE: 77 BPM | OXYGEN SATURATION: 99 % | TEMPERATURE: 98 F | SYSTOLIC BLOOD PRESSURE: 158 MMHG

## 2019-12-04 DIAGNOSIS — J40 BRONCHITIS: ICD-10-CM

## 2019-12-04 DIAGNOSIS — J01.00 ACUTE NON-RECURRENT MAXILLARY SINUSITIS: Primary | ICD-10-CM

## 2019-12-04 PROCEDURE — 99213 OFFICE O/P EST LOW 20 MIN: CPT | Performed by: FAMILY MEDICINE

## 2019-12-04 RX ORDER — DOXYCYCLINE HYCLATE 100 MG/1
100 CAPSULE ORAL 2 TIMES DAILY
Qty: 20 CAPSULE | Refills: 0 | Status: SHIPPED | OUTPATIENT
Start: 2019-12-04 | End: 2019-12-14

## 2019-12-04 RX ORDER — PREDNISONE 20 MG/1
TABLET ORAL
Qty: 10 TABLET | Refills: 0 | Status: SHIPPED | OUTPATIENT
Start: 2019-12-04 | End: 2019-12-12 | Stop reason: ALTCHOICE

## 2019-12-04 NOTE — PATIENT INSTRUCTIONS
Take antibiotics on an empty stomach and plenty of water. You may eat 30-60 minutes afterward. Eat yogurt or take probiotic daily. (Michael Sotelo is a good example of an OTC probiotic)    Take prednisone-- 2 tabs once daily for 5 days. Take with food.        Make

## 2019-12-04 NOTE — PROGRESS NOTES
CHIEF COMPLAINT:   Patient presents with:  Cough/URI: x 1 week-- diverticulitis last month-- levaquin/flagyl. has had cough off and on. getting worse for last few days-- more sputum, wheezing, low-grade temp off and on. . tmax 100.5        HPI:   Sande Severin benign 6/29/2012   • Glucose intolerance (pre-diabetes)    • High blood pressure    • Hyperlipidemia    • Impaired fasting glucose 6/29/2012   • Nephrolithiasis    • Neuropathy     feet   • Peripheral neuropathy, idiopathic 1/14/2013   • Sleep apnea    • V ASSESSMENT:  Acute non-recurrent maxillary sinusitis  (primary encounter diagnosis)  Bronchitis    PLAN:    Meds & Refills for this Visit:  Requested Prescriptions     Signed Prescriptions Disp Refills   • Doxycycline Hyclate 100 MG Oral Cap 20 capsule

## 2019-12-12 ENCOUNTER — OFFICE VISIT (OUTPATIENT)
Dept: SURGERY | Facility: CLINIC | Age: 69
End: 2019-12-12
Payer: MEDICARE

## 2019-12-12 VITALS — SYSTOLIC BLOOD PRESSURE: 121 MMHG | TEMPERATURE: 98 F | DIASTOLIC BLOOD PRESSURE: 84 MMHG | HEART RATE: 94 BPM

## 2019-12-12 DIAGNOSIS — E11.49 TYPE II DIABETES MELLITUS WITH NEUROLOGICAL MANIFESTATIONS (HCC): ICD-10-CM

## 2019-12-12 DIAGNOSIS — E66.01 SEVERE OBESITY WITH BODY MASS INDEX (BMI) OF 35.0 TO 39.9 WITH COMORBIDITY (HCC): ICD-10-CM

## 2019-12-12 DIAGNOSIS — I10 BENIGN ESSENTIAL HTN: ICD-10-CM

## 2019-12-12 DIAGNOSIS — K57.20 DIVERTICULITIS OF LARGE INTESTINE WITH PERFORATION WITHOUT BLEEDING: Primary | ICD-10-CM

## 2019-12-12 DIAGNOSIS — G47.33 OSA ON CPAP: ICD-10-CM

## 2019-12-12 DIAGNOSIS — Z99.89 OSA ON CPAP: ICD-10-CM

## 2019-12-12 PROCEDURE — 99214 OFFICE O/P EST MOD 30 MIN: CPT | Performed by: COLON & RECTAL SURGERY

## 2019-12-12 RX ORDER — NEOMYCIN SULFATE 500 MG/1
TABLET ORAL
Qty: 6 TABLET | Refills: 0 | Status: SHIPPED | OUTPATIENT
Start: 2019-12-12 | End: 2020-03-10

## 2019-12-12 RX ORDER — METRONIDAZOLE 500 MG/1
TABLET ORAL
Qty: 3 TABLET | Refills: 0 | Status: SHIPPED | OUTPATIENT
Start: 2019-12-12 | End: 2020-03-10

## 2019-12-12 RX ORDER — POLYETHYLENE GLYCOL 3350, SODIUM CHLORIDE, SODIUM BICARBONATE, POTASSIUM CHLORIDE 420; 11.2; 5.72; 1.48 G/4L; G/4L; G/4L; G/4L
POWDER, FOR SOLUTION ORAL
Qty: 1 BOTTLE | Refills: 0 | Status: SHIPPED | OUTPATIENT
Start: 2019-12-12 | End: 2020-03-10

## 2019-12-13 NOTE — PROGRESS NOTES
Follow Up Visit Note       Active Problems      1. Diverticulitis of large intestine with perforation without bleeding    2. Type II diabetes mellitus with neurological manifestations ([address]    3. Benign essential HTN    4. FRED on CPAP    5.  Severe obesity significant attack of diverticulitis where he had fluid around the colon and evidence of microperforations. This was a severe attack of diverticulitis, and was likely not his first attack.     The patient has a past surgical history significant for an endo pressure    • Hyperlipidemia    • Impaired fasting glucose 6/29/2012   • Nephrolithiasis    • Neuropathy     feet   • Peripheral neuropathy, idiopathic 1/14/2013   • Sleep apnea    • Visual impairment     [id number] glasses     Past Surgical History:   Proce equivalent per week        Frequency: 2-4 times a month        Drinks per session: 1 or 2        Binge frequency: Never      Drug use: No    Other Topics      Concerns:        Caffeine Concern: Yes          1cup daily        Sleep Concern: No        Exerci Physical Findings   /84   Pulse 94   Temp 98.4 °F (36.9 °C)   Physical Exam   Constitutional: He is oriented to person, place, and time. He appears well-developed and well-nourished. No distress. HENT:   Head: Normocephalic and atraumatic. bleeding  (primary encounter diagnosis)  Type II diabetes mellitus with neurological manifestations (HCC)  Benign essential HTN  FRED on CPAP  Severe obesity with body mass index (BMI) of 35.0 to 39.9 with comorbidity ([address]    [facility] presents t The patient has gotten colonoscopies with Dr. James Turcios since then, his most recent colonoscopy was in June 2018 where he had evidence of diverticulosis.     The patient has a past medical history significant for hypertension and he is currently on 81 mg of alternatives to the proposed operation were fully discussed with the patient. All questions from the patient were answered in detail. A description of the procedure and it's possible outcomes was fully discussed.     The patient seemed to understand the con

## 2019-12-13 NOTE — PATIENT INSTRUCTIONS
Milind Neal presents today for continued care and evaluation of his diverticulitis. The patient states that since his last visit on November 7, he did have one episode of abdominal pain the week after his visit.   He states that he got severe suprapubic currently on 81 mg of aspirin daily. Clinical examination of the abdomen reveals it to be soft, nondistended, nontender, bowel sounds are normal activity normal pitch. There  is no rebounding tenderness or guarding.   There are no signs of ascites or pe understand the conversation and its details. Consent for surgery was confirmed with the patient. During this visit, the Enhanced Recovery after Intestinal Surgery (ERAS) Patient Guide was discussed with Braden Goodrich.  He was provided a copy of the guide to re

## 2020-01-08 ENCOUNTER — TELEPHONE (OUTPATIENT)
Dept: SURGERY | Facility: CLINIC | Age: 70
End: 2020-01-08

## 2020-01-23 ENCOUNTER — APPOINTMENT (OUTPATIENT)
Dept: LAB | Facility: HOSPITAL | Age: 70
End: 2020-01-23
Payer: MEDICARE

## 2020-01-23 DIAGNOSIS — K57.20 DIVERTICULITIS OF LARGE INTESTINE WITH PERFORATION WITHOUT BLEEDING: ICD-10-CM

## 2020-01-23 LAB
ANION GAP SERPL CALC-SCNC: 7 MMOL/L (ref 0–18)
ATRIAL RATE: 68 BPM
BUN BLD-MCNC: 16 MG/DL (ref 7–18)
BUN/CREAT SERPL: 17.8 (ref 10–20)
CALCIUM BLD-MCNC: 9.1 MG/DL (ref 8.5–10.1)
CHLORIDE SERPL-SCNC: 110 MMOL/L (ref 98–112)
CO2 SERPL-SCNC: 24 MMOL/L (ref 21–32)
CREAT BLD-MCNC: 0.9 MG/DL (ref 0.7–1.3)
DEPRECATED RDW RBC AUTO: 44.2 FL (ref 35.1–46.3)
ERYTHROCYTE [DISTWIDTH] IN BLOOD BY AUTOMATED COUNT: 13.9 % (ref 11–15)
GLUCOSE BLD-MCNC: 117 MG/DL (ref 70–99)
HCT VFR BLD AUTO: 44.8 % (ref 39–53)
HGB BLD-MCNC: 14.6 G/DL (ref 13–17.5)
MCH RBC QN AUTO: 28.1 PG (ref 26–34)
MCHC RBC AUTO-ENTMCNC: 32.6 G/DL (ref 31–37)
MCV RBC AUTO: 86.2 FL (ref 80–100)
OSMOLALITY SERPL CALC.SUM OF ELEC: 294 MOSM/KG (ref 275–295)
P AXIS: 29 DEGREES
P-R INTERVAL: 160 MS
PATIENT FASTING Y/N/NP: YES
PLATELET # BLD AUTO: 208 10(3)UL (ref 150–450)
POTASSIUM SERPL-SCNC: 3.6 MMOL/L (ref 3.5–5.1)
Q-T INTERVAL: 416 MS
QRS DURATION: 94 MS
QTC CALCULATION (BEZET): 442 MS
R AXIS: 18 DEGREES
RBC # BLD AUTO: 5.2 X10(6)UL (ref 3.8–5.8)
SODIUM SERPL-SCNC: 141 MMOL/L (ref 136–145)
T AXIS: 50 DEGREES
VENTRICULAR RATE: 68 BPM
WBC # BLD AUTO: 7.7 X10(3) UL (ref 4–11)

## 2020-01-23 PROCEDURE — 85027 COMPLETE CBC AUTOMATED: CPT

## 2020-01-23 PROCEDURE — 93005 ELECTROCARDIOGRAM TRACING: CPT

## 2020-01-23 PROCEDURE — 93010 ELECTROCARDIOGRAM REPORT: CPT | Performed by: INTERNAL MEDICINE

## 2020-01-23 PROCEDURE — 36415 COLL VENOUS BLD VENIPUNCTURE: CPT

## 2020-01-23 PROCEDURE — 80048 BASIC METABOLIC PNL TOTAL CA: CPT

## 2020-01-30 ENCOUNTER — ANESTHESIA EVENT (OUTPATIENT)
Dept: SURGERY | Facility: HOSPITAL | Age: 70
DRG: 330 | End: 2020-01-30
Payer: MEDICARE

## 2020-01-31 ENCOUNTER — ANESTHESIA (OUTPATIENT)
Dept: SURGERY | Facility: HOSPITAL | Age: 70
DRG: 330 | End: 2020-01-31
Payer: MEDICARE

## 2020-01-31 ENCOUNTER — HOSPITAL ENCOUNTER (INPATIENT)
Facility: HOSPITAL | Age: 70
LOS: 3 days | Discharge: HOME | DRG: 330 | End: 2020-02-03
Attending: COLON & RECTAL SURGERY | Admitting: COLON & RECTAL SURGERY
Payer: MEDICARE

## 2020-01-31 DIAGNOSIS — K57.20 DIVERTICULITIS OF LARGE INTESTINE WITH PERFORATION WITHOUT BLEEDING: Primary | ICD-10-CM

## 2020-01-31 LAB
GLUCOSE BLD-MCNC: 115 MG/DL (ref 70–99)
GLUCOSE BLD-MCNC: 149 MG/DL (ref 70–99)
GLUCOSE BLD-MCNC: 154 MG/DL (ref 70–99)
GLUCOSE BLD-MCNC: 159 MG/DL (ref 70–99)
ISTAT BLOOD GAS BASE DEFICIT: -4 MMOL/L
ISTAT BLOOD GAS BASE DEFICIT: -4 MMOL/L
ISTAT BLOOD GAS HCO3: 21.3 MEQ/L (ref 22–26)
ISTAT BLOOD GAS HCO3: 22.8 MEQ/L (ref 22–26)
ISTAT BLOOD GAS O2 SATURATION: 100 % (ref 92–100)
ISTAT BLOOD GAS O2 SATURATION: 99 % (ref 92–100)
ISTAT BLOOD GAS PCO2: 37.3 MMHG (ref 35–45)
ISTAT BLOOD GAS PCO2: 51.3 MMHG (ref 35–45)
ISTAT BLOOD GAS PH: 7.26 (ref 7.35–7.45)
ISTAT BLOOD GAS PH: 7.37 (ref 7.35–7.45)
ISTAT BLOOD GAS PO2: 179 MMHG (ref 80–105)
ISTAT BLOOD GAS PO2: 226 MMHG (ref 80–105)
ISTAT BLOOD GAS TCO2: 22 MMOL/L (ref 22–32)
ISTAT BLOOD GAS TCO2: 24 MMOL/L (ref 22–32)
ISTAT HEMATOCRIT: 32 % (ref 37–53)
ISTAT HEMATOCRIT: 38 % (ref 37–53)
ISTAT IONIZED CALCIUM: 1.1 MMOL/L (ref 1.12–1.32)
ISTAT IONIZED CALCIUM: 1.11 MMOL/L (ref 1.12–1.32)
ISTAT POTASSIUM: 3.6 MMOL/L (ref 3.6–5.1)
ISTAT POTASSIUM: 3.8 MMOL/L (ref 3.6–5.1)
ISTAT SODIUM: 139 MMOL/L (ref 136–145)
ISTAT SODIUM: 140 MMOL/L (ref 136–145)

## 2020-01-31 PROCEDURE — 0DTN0ZZ RESECTION OF SIGMOID COLON, OPEN APPROACH: ICD-10-PCS | Performed by: COLON & RECTAL SURGERY

## 2020-01-31 PROCEDURE — 8E0W4CZ ROBOTIC ASSISTED PROCEDURE OF TRUNK REGION, PERCUTANEOUS ENDOSCOPIC APPROACH: ICD-10-PCS | Performed by: COLON & RECTAL SURGERY

## 2020-01-31 PROCEDURE — P9045 ALBUMIN (HUMAN), 5%, 250 ML: HCPCS | Performed by: ANESTHESIOLOGY

## 2020-01-31 PROCEDURE — 0DJD8ZZ INSPECTION OF LOWER INTESTINAL TRACT, VIA NATURAL OR ARTIFICIAL OPENING ENDOSCOPIC: ICD-10-PCS | Performed by: COLON & RECTAL SURGERY

## 2020-01-31 RX ORDER — FAMOTIDINE 20 MG/1
20 TABLET ORAL 2 TIMES DAILY
Status: DISCONTINUED | OUTPATIENT
Start: 2020-01-31 | End: 2020-02-03

## 2020-01-31 RX ORDER — ACETAMINOPHEN 500 MG
1000 TABLET ORAL EVERY 8 HOURS
Status: DISCONTINUED | OUTPATIENT
Start: 2020-01-31 | End: 2020-02-03

## 2020-01-31 RX ORDER — SODIUM CHLORIDE, SODIUM LACTATE, POTASSIUM CHLORIDE, CALCIUM CHLORIDE 600; 310; 30; 20 MG/100ML; MG/100ML; MG/100ML; MG/100ML
INJECTION, SOLUTION INTRAVENOUS CONTINUOUS
Status: DISCONTINUED | OUTPATIENT
Start: 2020-01-31 | End: 2020-01-31 | Stop reason: HOSPADM

## 2020-01-31 RX ORDER — GABAPENTIN 100 MG/1
200 CAPSULE ORAL ONCE
Status: COMPLETED | OUTPATIENT
Start: 2020-01-31 | End: 2020-01-31

## 2020-01-31 RX ORDER — ONDANSETRON 2 MG/ML
INJECTION INTRAMUSCULAR; INTRAVENOUS AS NEEDED
Status: DISCONTINUED | OUTPATIENT
Start: 2020-01-31 | End: 2020-01-31

## 2020-01-31 RX ORDER — HEPARIN SODIUM 5000 [USP'U]/ML
5000 INJECTION, SOLUTION INTRAVENOUS; SUBCUTANEOUS EVERY 8 HOURS SCHEDULED
Status: DISCONTINUED | OUTPATIENT
Start: 2020-02-01 | End: 2020-02-03

## 2020-01-31 RX ORDER — GLYCOPYRROLATE 0.2 MG/ML
INJECTION, SOLUTION INTRAMUSCULAR; INTRAVENOUS AS NEEDED
Status: DISCONTINUED | OUTPATIENT
Start: 2020-01-31 | End: 2020-01-31 | Stop reason: SURG

## 2020-01-31 RX ORDER — LIDOCAINE HYDROCHLORIDE ANHYDROUS AND DEXTROSE MONOHYDRATE .8; 5 G/100ML; G/100ML
INJECTION, SOLUTION INTRAVENOUS CONTINUOUS PRN
Status: DISCONTINUED | OUTPATIENT
Start: 2020-01-31 | End: 2020-01-31 | Stop reason: SURG

## 2020-01-31 RX ORDER — LIDOCAINE HYDROCHLORIDE 10 MG/ML
INJECTION, SOLUTION EPIDURAL; INFILTRATION; INTRACAUDAL; PERINEURAL AS NEEDED
Status: DISCONTINUED | OUTPATIENT
Start: 2020-01-31 | End: 2020-01-31 | Stop reason: SURG

## 2020-01-31 RX ORDER — CLINDAMYCIN PHOSPHATE 600 MG/50ML
600 INJECTION INTRAVENOUS EVERY 8 HOURS
Status: COMPLETED | OUTPATIENT
Start: 2020-01-31 | End: 2020-02-01

## 2020-01-31 RX ORDER — ACETAMINOPHEN 500 MG
1000 TABLET ORAL ONCE
Status: DISCONTINUED | OUTPATIENT
Start: 2020-01-31 | End: 2020-01-31 | Stop reason: HOSPADM

## 2020-01-31 RX ORDER — TRIAMTERENE AND HYDROCHLOROTHIAZIDE 75; 50 MG/1; MG/1
1 TABLET ORAL DAILY
Status: DISCONTINUED | OUTPATIENT
Start: 2020-02-01 | End: 2020-02-03

## 2020-01-31 RX ORDER — HYDROMORPHONE HYDROCHLORIDE 1 MG/ML
0.2 INJECTION, SOLUTION INTRAMUSCULAR; INTRAVENOUS; SUBCUTANEOUS EVERY 2 HOUR PRN
Status: DISCONTINUED | OUTPATIENT
Start: 2020-01-31 | End: 2020-02-03

## 2020-01-31 RX ORDER — LISINOPRIL 40 MG/1
40 TABLET ORAL DAILY
Status: DISCONTINUED | OUTPATIENT
Start: 2020-02-01 | End: 2020-02-03

## 2020-01-31 RX ORDER — SODIUM CHLORIDE 9 MG/ML
INJECTION, SOLUTION INTRAVENOUS CONTINUOUS PRN
Status: DISCONTINUED | OUTPATIENT
Start: 2020-01-31 | End: 2020-01-31 | Stop reason: SURG

## 2020-01-31 RX ORDER — DOCUSATE SODIUM 100 MG/1
100 CAPSULE, LIQUID FILLED ORAL 2 TIMES DAILY
Status: DISCONTINUED | OUTPATIENT
Start: 2020-01-31 | End: 2020-02-03

## 2020-01-31 RX ORDER — METOPROLOL SUCCINATE 50 MG/1
50 TABLET, EXTENDED RELEASE ORAL
Status: DISCONTINUED | OUTPATIENT
Start: 2020-02-01 | End: 2020-02-03

## 2020-01-31 RX ORDER — HEPARIN SODIUM 5000 [USP'U]/ML
5000 INJECTION, SOLUTION INTRAVENOUS; SUBCUTANEOUS ONCE
Status: COMPLETED | OUTPATIENT
Start: 2020-01-31 | End: 2020-01-31

## 2020-01-31 RX ORDER — HYDROCODONE BITARTRATE AND ACETAMINOPHEN 5; 325 MG/1; MG/1
2 TABLET ORAL EVERY 4 HOURS PRN
Status: DISCONTINUED | OUTPATIENT
Start: 2020-01-31 | End: 2020-02-03

## 2020-01-31 RX ORDER — ONDANSETRON 2 MG/ML
4 INJECTION INTRAMUSCULAR; INTRAVENOUS AS NEEDED
Status: DISCONTINUED | OUTPATIENT
Start: 2020-01-31 | End: 2020-01-31 | Stop reason: HOSPADM

## 2020-01-31 RX ORDER — HYDROMORPHONE HYDROCHLORIDE 1 MG/ML
0.4 INJECTION, SOLUTION INTRAMUSCULAR; INTRAVENOUS; SUBCUTANEOUS EVERY 2 HOUR PRN
Status: DISCONTINUED | OUTPATIENT
Start: 2020-01-31 | End: 2020-02-03

## 2020-01-31 RX ORDER — HYDROMORPHONE HYDROCHLORIDE 1 MG/ML
0.8 INJECTION, SOLUTION INTRAMUSCULAR; INTRAVENOUS; SUBCUTANEOUS EVERY 2 HOUR PRN
Status: DISCONTINUED | OUTPATIENT
Start: 2020-01-31 | End: 2020-02-03

## 2020-01-31 RX ORDER — HYDROCODONE BITARTRATE AND ACETAMINOPHEN 5; 325 MG/1; MG/1
1 TABLET ORAL EVERY 4 HOURS PRN
Status: DISCONTINUED | OUTPATIENT
Start: 2020-01-31 | End: 2020-02-03

## 2020-01-31 RX ORDER — HYDROMORPHONE HYDROCHLORIDE 1 MG/ML
0.4 INJECTION, SOLUTION INTRAMUSCULAR; INTRAVENOUS; SUBCUTANEOUS EVERY 5 MIN PRN
Status: DISCONTINUED | OUTPATIENT
Start: 2020-01-31 | End: 2020-01-31 | Stop reason: HOSPADM

## 2020-01-31 RX ORDER — DEXAMETHASONE SODIUM PHOSPHATE 4 MG/ML
VIAL (ML) INJECTION AS NEEDED
Status: DISCONTINUED | OUTPATIENT
Start: 2020-01-31 | End: 2020-01-31 | Stop reason: SURG

## 2020-01-31 RX ORDER — FAMOTIDINE 10 MG/ML
20 INJECTION, SOLUTION INTRAVENOUS 2 TIMES DAILY
Status: DISCONTINUED | OUTPATIENT
Start: 2020-01-31 | End: 2020-02-03

## 2020-01-31 RX ORDER — ROCURONIUM BROMIDE 10 MG/ML
INJECTION, SOLUTION INTRAVENOUS AS NEEDED
Status: DISCONTINUED | OUTPATIENT
Start: 2020-01-31 | End: 2020-01-31 | Stop reason: SURG

## 2020-01-31 RX ORDER — ALBUMIN, HUMAN INJ 5% 5 %
SOLUTION INTRAVENOUS CONTINUOUS PRN
Status: DISCONTINUED | OUTPATIENT
Start: 2020-01-31 | End: 2020-01-31 | Stop reason: SURG

## 2020-01-31 RX ORDER — POLYETHYLENE GLYCOL 3350 17 G/17G
17 POWDER, FOR SOLUTION ORAL DAILY PRN
Status: DISCONTINUED | OUTPATIENT
Start: 2020-01-31 | End: 2020-02-03

## 2020-01-31 RX ORDER — KETAMINE HYDROCHLORIDE 50 MG/ML
INJECTION, SOLUTION, CONCENTRATE INTRAMUSCULAR; INTRAVENOUS AS NEEDED
Status: DISCONTINUED | OUTPATIENT
Start: 2020-01-31 | End: 2020-01-31 | Stop reason: SURG

## 2020-01-31 RX ORDER — NALOXONE HYDROCHLORIDE 0.4 MG/ML
80 INJECTION, SOLUTION INTRAMUSCULAR; INTRAVENOUS; SUBCUTANEOUS AS NEEDED
Status: DISCONTINUED | OUTPATIENT
Start: 2020-01-31 | End: 2020-01-31 | Stop reason: HOSPADM

## 2020-01-31 RX ORDER — ONDANSETRON 2 MG/ML
4 INJECTION INTRAMUSCULAR; INTRAVENOUS EVERY 4 HOURS PRN
Status: DISCONTINUED | OUTPATIENT
Start: 2020-01-31 | End: 2020-02-03

## 2020-01-31 RX ORDER — PHENYLEPHRINE HCL 10 MG/ML
VIAL (ML) INJECTION AS NEEDED
Status: DISCONTINUED | OUTPATIENT
Start: 2020-01-31 | End: 2020-01-31 | Stop reason: SURG

## 2020-01-31 RX ORDER — NEOSTIGMINE METHYLSULFATE 1 MG/ML
INJECTION INTRAVENOUS AS NEEDED
Status: DISCONTINUED | OUTPATIENT
Start: 2020-01-31 | End: 2020-01-31 | Stop reason: SURG

## 2020-01-31 RX ORDER — BUPIVACAINE HYDROCHLORIDE AND EPINEPHRINE 5; 5 MG/ML; UG/ML
INJECTION, SOLUTION EPIDURAL; INTRACAUDAL; PERINEURAL AS NEEDED
Status: DISCONTINUED | OUTPATIENT
Start: 2020-01-31 | End: 2020-01-31 | Stop reason: HOSPADM

## 2020-01-31 RX ORDER — DEXTROSE MONOHYDRATE 25 G/50ML
50 INJECTION, SOLUTION INTRAVENOUS
Status: DISCONTINUED | OUTPATIENT
Start: 2020-01-31 | End: 2020-01-31 | Stop reason: HOSPADM

## 2020-01-31 RX ORDER — DEXTROSE AND SODIUM CHLORIDE 5; .45 G/100ML; G/100ML
INJECTION, SOLUTION INTRAVENOUS CONTINUOUS
Status: DISCONTINUED | OUTPATIENT
Start: 2020-01-31 | End: 2020-02-03

## 2020-01-31 RX ORDER — HYDROCODONE BITARTRATE AND ACETAMINOPHEN 5; 325 MG/1; MG/1
2 TABLET ORAL AS NEEDED
Status: DISCONTINUED | OUTPATIENT
Start: 2020-01-31 | End: 2020-01-31 | Stop reason: HOSPADM

## 2020-01-31 RX ORDER — HYDROCODONE BITARTRATE AND ACETAMINOPHEN 5; 325 MG/1; MG/1
1 TABLET ORAL AS NEEDED
Status: DISCONTINUED | OUTPATIENT
Start: 2020-01-31 | End: 2020-01-31 | Stop reason: HOSPADM

## 2020-01-31 RX ORDER — ONDANSETRON 2 MG/ML
INJECTION INTRAMUSCULAR; INTRAVENOUS AS NEEDED
Status: DISCONTINUED | OUTPATIENT
Start: 2020-01-31 | End: 2020-01-31 | Stop reason: SURG

## 2020-01-31 RX ORDER — CLINDAMYCIN PHOSPHATE 900 MG/50ML
900 INJECTION INTRAVENOUS ONCE
Status: COMPLETED | OUTPATIENT
Start: 2020-01-31 | End: 2020-01-31

## 2020-01-31 RX ORDER — HYDROMORPHONE HYDROCHLORIDE 1 MG/ML
INJECTION, SOLUTION INTRAMUSCULAR; INTRAVENOUS; SUBCUTANEOUS
Status: COMPLETED
Start: 2020-01-31 | End: 2020-01-31

## 2020-01-31 RX ORDER — SODIUM CHLORIDE 9 MG/ML
INJECTION, SOLUTION INTRAVENOUS CONTINUOUS
Status: DISCONTINUED | OUTPATIENT
Start: 2020-01-31 | End: 2020-02-03

## 2020-01-31 RX ADMIN — ROCURONIUM BROMIDE 20 MG: 10 INJECTION, SOLUTION INTRAVENOUS at 14:02:00

## 2020-01-31 RX ADMIN — CLINDAMYCIN PHOSPHATE 900 MG: 900 INJECTION INTRAVENOUS at 13:25:00

## 2020-01-31 RX ADMIN — LIDOCAINE HYDROCHLORIDE ANHYDROUS AND DEXTROSE MONOHYDRATE 2 MG/KG/HR: .8; 5 INJECTION, SOLUTION INTRAVENOUS at 13:14:00

## 2020-01-31 RX ADMIN — PHENYLEPHRINE HCL 50 MCG: 10 MG/ML VIAL (ML) INJECTION at 15:15:00

## 2020-01-31 RX ADMIN — SODIUM CHLORIDE: 9 INJECTION, SOLUTION INTRAVENOUS at 13:55:00

## 2020-01-31 RX ADMIN — LIDOCAINE HYDROCHLORIDE 100 MG: 10 INJECTION, SOLUTION EPIDURAL; INFILTRATION; INTRACAUDAL; PERINEURAL at 12:57:00

## 2020-01-31 RX ADMIN — SODIUM CHLORIDE: 9 INJECTION, SOLUTION INTRAVENOUS at 13:10:00

## 2020-01-31 RX ADMIN — GLYCOPYRROLATE 0.8 MG: 0.2 INJECTION, SOLUTION INTRAMUSCULAR; INTRAVENOUS at 16:10:00

## 2020-01-31 RX ADMIN — DEXAMETHASONE SODIUM PHOSPHATE 8 MG: 4 MG/ML VIAL (ML) INJECTION at 13:42:00

## 2020-01-31 RX ADMIN — ONDANSETRON 4 MG: 2 INJECTION INTRAMUSCULAR; INTRAVENOUS at 15:54:00

## 2020-01-31 RX ADMIN — ALBUMIN, HUMAN INJ 5%: 5 SOLUTION INTRAVENOUS at 14:40:00

## 2020-01-31 RX ADMIN — ROCURONIUM BROMIDE 80 MG: 10 INJECTION, SOLUTION INTRAVENOUS at 12:57:00

## 2020-01-31 RX ADMIN — SODIUM CHLORIDE: 9 INJECTION, SOLUTION INTRAVENOUS at 15:51:00

## 2020-01-31 RX ADMIN — ROCURONIUM BROMIDE 20 MG: 10 INJECTION, SOLUTION INTRAVENOUS at 14:38:00

## 2020-01-31 RX ADMIN — NEOSTIGMINE METHYLSULFATE 5 MG: 1 INJECTION INTRAVENOUS at 16:10:00

## 2020-01-31 RX ADMIN — KETAMINE HYDROCHLORIDE 25 MG: 50 INJECTION, SOLUTION, CONCENTRATE INTRAMUSCULAR; INTRAVENOUS at 12:57:00

## 2020-01-31 RX ADMIN — ROCURONIUM BROMIDE 10 MG: 10 INJECTION, SOLUTION INTRAVENOUS at 15:13:00

## 2020-01-31 RX ADMIN — ALBUMIN, HUMAN INJ 5%: 5 SOLUTION INTRAVENOUS at 15:14:00

## 2020-01-31 NOTE — ANESTHESIA POSTPROCEDURE EVALUATION
[address] Patient Status:  Surgery Admit - Inpt   Age/Gender 78year old male MRN [id number]   [facility] SURGERY Attending Lulú Wells MD   Hosp Day # 0 PCP Kenton Hall MD       Anesthesia Post-op Note    Proce

## 2020-01-31 NOTE — H&P
Active Problems      1. Diverticulitis of large intestine with perforation without bleeding    2. Severe obesity with body mass index (BMI) of 35.0 to 39.9 with comorbidity [address]    3. Type II diabetes mellitus with neurological manifestations ([facility]    4. He is tolerating a diet. He points to the lower abdomen across the beltline as the point of where his symptoms had previously taken place. There was definitely a predilection of left greater than right.       He had colonoscopy done 1 year ago by Dr. Kelle Landis PANCREAS:  No lesion, fluid collection, ductal dilatation, or atrophy. SPLEEN:  No enlargement or focal lesion. KIDNEYS:  Nonobstructing 5 mm calculus in the lower pole the right kidney is noted.   There is an exophytic cyst off the mid right kidney m left lower eye   • BCC (basal cell carcinoma), shoulder 12/10/15     left shoulder   • Calculus of kidney     • Disorder of liver       fatty liver disease   • Essential hypertension     • Essential hypertension, benign 6/29/2012   • Glucose intolerance Smoking status: Former Smoker        Packs/day: 0.50        Years: 5.00        Pack years: 2.5        Quit date: 1970        Years since quittin.8      Smokeless tobacco: Never Used    Substance and Sexual Activity      Alcohol use:  Yes Abdominal: Soft. Normal appearance, normal aorta and bowel sounds are normal. He exhibits no distension, no fluid wave, no ascites, no pulsatile midline mass and no mass. There is no splenomegaly or hepatomegaly. There is no tenderness.  There is no rigidit All risks, benefits, complications and alternatives to the proposed operation were fully discussed with the patient. All questions from the patient were answered in detail. A description of the procedure and its possible outcomes was fully discussed.     Bernett Scheuermann

## 2020-01-31 NOTE — ANESTHESIA PREPROCEDURE EVALUATION
PRE-OP EVALUATION    Patient Name: Shiva Arroyo    Pre-op Diagnosis: Diverticulitis of large intestine with perforation without bleeding [K57.20]    Procedure(s):  XI ROBOTIC LOW ANTERIOR RESECTION OF THE RECTOSIGMOID COLON WITH COLORECTAL ANASTOMOSIS, P IOL's   • COLONOSCOPY N/A 6/1/2018    Performed by Jake Morgan DO at [facility] ENDOSCOPY   • COLONOSCOPY N/A 5/17/2017    Performed by Jake Morgan DO at [facility] ENDOSCOPY   • COLONOSCOPY N/A 9/23/2016    Performed by Ines Troncoso DO at [facility] ENDOSCOPY history. Pulmonary      Breath sounds clear to auscultation bilaterally. Other findings            ASA: 3   Plan: general  NPO status verified and patient meets guidelines. Patient has taken beta blockers in last 24 hours.   Post-proc

## 2020-01-31 NOTE — ANESTHESIA PROCEDURE NOTES
Arterial Line  Performed by: Radha Or., DO  Authorized by: Radha Or., DO     General Information and Staff    Procedure Start:   Anesthesiologist: Radha Or., DO  Performed By:  Anesthesiologist  Patient Location:  OR  Indication: continuou

## 2020-01-31 NOTE — OPERATIVE REPORT
[facility]  Operative Note    Suzy Velazquez Location: OR   [facility] [id number] MRN [id number]   Admission Date 1/31/2020 Operation Date 1/31/2020   Attending Physician Brisa Santiago MD Operating Physician Warrick Dancer, MD     Pre-Operative Diagnosis: Diver diverticulitis. Interloop abscesses did involve loops of small intestine which were taken down without complication. Approximately 45 cc of pus was encountered and 2 separate interloop abscesses. They were adequately drained and irrigated.   The patient sidewall. We found it necessary to completely mobilize the splenic flexure. This was done by coming around the splenic flexure with scissors dissection. This was carried to the level of the transverse omentum.   The entire left colon was mobilized from resected. This included the area of the active diverticulitis. Our plan resection is from near the descending sigmoid junction, all the way down to the mid and upper rectum.     Once we were assured that the rectum was easily mobilized out of the pelvis, irrigated. The anvil within the cut portion of the colon was now secured and then reduced below the peritoneum. The left lower quadrant incision was then closed in layers.   The deep layer to the posterior rectus fascia and peritoneum with #1 running injected into all wound margins. Steri-Strips are placed over benzoin adhesive. The patient was delivered to recovery room in stable postoperative condition.         Pathologic specimens: Rectosigmoid colon    Estimated blood loss: 75 cc    Drains: One l

## 2020-01-31 NOTE — ANESTHESIA PROCEDURE NOTES
Airway  Urgency: elective      General Information and Staff    Patient location during procedure: OR  Anesthesiologist: Yasemin Lora DO  Performed: anesthesiologist     Indications and Patient Condition  Indications for airway management: anesthesia  S

## 2020-02-01 PROCEDURE — 5A09357 ASSISTANCE WITH RESPIRATORY VENTILATION, LESS THAN 24 CONSECUTIVE HOURS, CONTINUOUS POSITIVE AIRWAY PRESSURE: ICD-10-PCS | Performed by: COLON & RECTAL SURGERY

## 2020-02-01 NOTE — PLAN OF CARE
Problem: Patient/Family Goals  Goal: Patient/Family Long Term Goal  Description  Patient's Long Term Goal: DISCHARGE HOME    Interventions:  - PAIN TOLERABLE  -TOLERATING DIET  -RETURN TO PREVIOUS ADL'S  - See additional Care Plan goals for specific inte eating environment  Outcome: Progressing  Goal: Achieves appropriate nutritional intake (bariatric)  Description  INTERVENTIONS:  - Monitor for over-consumption  - Identify factors contributing to increased intake, treat as appropriate  - Monitor I&O, WT a

## 2020-02-01 NOTE — PHYSICAL THERAPY NOTE
PHYSICAL THERAPY QUICK EVALUATION - INPATIENT    Room Number: 322/322-A  Evaluation Date: 2/1/2020  Presenting Problem: abdominal pain with constipation  Physician Order: PT Eval and Treat     Pt is 78year old male admitted on 1/31/2020 from home with c shattered L heel - Metal bolt   • LESION WIDE EXCISION WITH SKIN GRAFT Left 8/26/2015    Performed by Katty Castillo MD at [facility] MAIN OR   • REMOVAL OF [address]   • SKIN SURGERY  8-19-15    Mohs surgery for [facility] to left lower eyelid   • SKIN SURGERY Standardized Score (AM-PAC Scale): 56.93   CMS Modifier (G-Code): CI      FUNCTIONAL ABILITY STATUS  Gait Assessment  Gait Assistance: Modified independent  Distance (ft): 150  Assistive Device: Rolling walker  Pattern: (with leg length discrepancy)  Sto Physical Therapy services. Please re-order if a new functional limitation presents during this admission. GOALS  Patient was able to achieve the following goals . ..     Patient was able to transfer At previous, functional level  Safely and independently

## 2020-02-01 NOTE — RESPIRATORY THERAPY NOTE
FRED : EQUIPMENT USE: DAILY SUMMARY                                            SET MODE:AUTO CPAP                                          USAGE IN HOURS:3:10                                          90% EXP. PRESSUR

## 2020-02-01 NOTE — PROGRESS NOTES
[facility]  Progress Note    [address] Patient Status:  Inpatient    1950 MRN [id number]   [facility] 3NW-A Attending Alberto Mendoza MD   Hosp Day # 1 PCP Miguel Angel Piedra MD     Subjective:  No new complaints, incisional maría options.     Tristan Hernandez  2/1/2020  8:57 AM

## 2020-02-01 NOTE — PLAN OF CARE
Problem: Patient/Family Goals  Goal: Patient/Family Long Term Goal  Description  Patient's Long Term Goal: DISCHARGE HOME    Interventions:  - PAIN TOLERABLE  -TOLERATING DIET  -RETURN TO PREVIOUS ADL'S  - See additional Care Plan goals for specific inte eating environment  Outcome: Progressing  Goal: Achieves appropriate nutritional intake (bariatric)  Description  INTERVENTIONS:  - Monitor for over-consumption  - Identify factors contributing to increased intake, treat as appropriate  - Monitor I&O, WT a with strengthening/mobility  - Encourage toileting schedule  Outcome: Progressing     Problem: DISCHARGE PLANNING  Goal: Discharge to home or other facility with appropriate resources  Description  INTERVENTIONS:  - Identify barriers to discharge w/pt and

## 2020-02-01 NOTE — PROGRESS NOTES
PATIENT IS SALINE LOCKED, ON ROOM AIR, ON TELE RUNNING NSR, [place] DISCONTINUED AND PATIENT IS VOIDING WELL, AMBULATES INDEPENDENTLY W/ A WALKER, INCISIONS ARE C/D/I, YAQUELIN DRAIN X1 WITH BLOODY DRAINAGE, NOT PASSING FLATUS, TOLERATING A CLEAR LIQUID ERAS DIET,

## 2020-02-01 NOTE — PLAN OF CARE
Problem: GASTROINTESTINAL - ADULT  Goal: Minimal or absence of nausea and vomiting  Description  INTERVENTIONS:  - Maintain adequate hydration with IV or PO as ordered and tolerated  - Nasogastric tube to low intermittent suction as ordered  - Evaluate e assistance  - Assess pain using appropriate pain scale  - Administer analgesics based on type and severity of pain and evaluate response  - Implement non-pharmacological measures as appropriate and evaluate response  - Consider cultural and social influenc patient/family/discharge partner  - Complete POLST form as appropriate  - Assess patient's ability to be responsible for managing their own health  - Refer to Case Management Department for coordinating discharge planning if the patient needs post-hospital

## 2020-02-02 NOTE — PLAN OF CARE
Problem: GASTROINTESTINAL - ADULT  Goal: Minimal or absence of nausea and vomiting  Description  INTERVENTIONS:  - Maintain adequate hydration with IV or PO as ordered and tolerated  - Nasogastric tube to low intermittent suction as ordered  - Evaluate e Assess pain using appropriate pain scale  - Administer analgesics based on type and severity of pain and evaluate response  - Implement non-pharmacological measures as appropriate and evaluate response  - Consider cultural and social influences on pain and patient/family/discharge partner  - Complete POLST form as appropriate  - Assess patient's ability to be responsible for managing their own health  - Refer to Case Management Department for coordinating discharge planning if the patient needs post-hospital

## 2020-02-02 NOTE — PROGRESS NOTES
2/2/2020 Pt resting in bed at this time. A+Ox3. RA. Cpox and tele on. Tolerating clear liquids. Denies nausea. Denies flatus. Abdominal distention reported by patient. Abdominal lap sites x6 with steristrips c/d/i.  Right abdominal YAQUELIN drain remains to bulb

## 2020-02-02 NOTE — PROGRESS NOTES
02/01/20 2108   Clinical Encounter Type   Visited With Patient and family together  (Wife and daughter at bedside)   Routine Visit Introduction   Continue Visiting No   Patient's Supportive Strategies/Resources  provided emotional support   Reli

## 2020-02-02 NOTE — RESPIRATORY THERAPY NOTE
FRED : EQUIPMENT USE: DAILY SUMMARY                                            SET MODE: AUTO CPAP WITH CFLEX                                          USAGE IN HOURS:7:37                                          90%

## 2020-02-03 VITALS
TEMPERATURE: 98 F | HEIGHT: 68 IN | HEART RATE: 64 BPM | DIASTOLIC BLOOD PRESSURE: 64 MMHG | WEIGHT: 236.56 LBS | RESPIRATION RATE: 18 BRPM | SYSTOLIC BLOOD PRESSURE: 119 MMHG | OXYGEN SATURATION: 97 % | BODY MASS INDEX: 35.85 KG/M2

## 2020-02-03 RX ORDER — HYDROCODONE BITARTRATE AND ACETAMINOPHEN 5; 325 MG/1; MG/1
1 TABLET ORAL EVERY 6 HOURS PRN
Qty: 15 TABLET | Refills: 0 | Status: SHIPPED | OUTPATIENT
Start: 2020-02-03 | End: 2020-03-10

## 2020-02-03 NOTE — PROGRESS NOTES
[facility]  Progress Note    [address] Patient Status:  Inpatient    1950 MRN [id number]   [facility] 3NW-A Attending Oscar Tobin MD   [facility] Day # 3 PCP Santosh Cervantes MD     Subjective:  No new complaints, incisional maría than 10 pounds, no driving, may shower  5. Regular diet  6. Norco as needed for pain  7. All questions answered    My total face time with this patient was 24 minutes.   Greater than half of our visit was spent in counseling the patient on the above listed

## 2020-02-03 NOTE — PROGRESS NOTES
NURSING DISCHARGE NOTE    Discharged Home via Wheelchair. Accompanied by Support staff  Belongings Taken by patient/family. Vss. Pt a&ox3. Pt on ra with 02 sats wnl. Lungs cta. Pt denies difficulty breathing or sob. Pt denies n/v.  Rosalea Lick

## 2020-02-03 NOTE — OCCUPATIONAL THERAPY NOTE
OT orders to eval and treat received, chart reviewed and screened pt. Patient is up walking w/ RW ad dalia and able to manage basic self-care here in hospital s/p low anterior colon resection. Wife will be at home to assist prn during recovery period.   No

## 2020-02-03 NOTE — DISCHARGE SUMMARY
[facility]  Discharge Summary      Patient Status:  Inpatient    1950 MRN [id number]   [facility] 3NW-A Attending Aline Cabezas MD   [facility] Day # 3 PCP Daniella Fernando MD     Date of Admission: 2020    Date of Kaylee Swanson fever during his recovery. Angie Rodriguez also now has a slight cough.  He currently has temperatures in the 99's.  He has [place] no abdominal pain.     He currently denies any nausea or vomiting.  He has no current constipation or diarrhea.     The patient has had a Medications: Current Discharge Medication List    START taking these medications    HYDROcodone-acetaminophen (NORCO) 5-325 MG Oral Tab  Take 1 tablet by mouth every 6 (six) hours as needed for Pain.   Qty: 15 tablet Refills: 0      CONTINUE these medicatio

## 2020-02-03 NOTE — PROGRESS NOTES
[facility]  Progress Note    [address] Patient Status:  Inpatient    1950 MRN [id number]   [facility] 3NW-A Attending Sony Arcos MD   [facility] Day # 2 PCP Duran Vila MD     Subjective:  No new complaints.   He states karen anastomosis and mobilization of the splenic flexure    Plan:  1. Continue clear liquid diet  2. Await return of bowel function  3. Ambulate  4. DVT prophylaxis    My total face time with this patient was 22 minutes.   Greater than half of our visit was spen

## 2020-02-03 NOTE — RESPIRATORY THERAPY NOTE
FRED Equipment Usage Summary :            Set Mode :AUTO CPAP W FLEX          Usage in Hours:7;24          90% Pressure (EPAP) : 14.5           90% Insp Pressure (IPAP);           AHI : 4.1          Supplemental Oxygen :      LPM

## 2020-02-03 NOTE — PAYOR COMM NOTE
--------------  ADMISSION REVIEW     Payor: Danielle Olvera #:  [id number]  Authorization Number: [id number]    Admit date: 1/31/20  Admit time: [address]       Admitting Physician: Tomasz Wilson MD  Attending Physician:  No att. providers found  Pr He currently denies any nausea or vomiting. He has no current constipation or diarrhea. The patient has had a previous kidney stone extracted surgically. This is through a long left flank incision that ends near the mid axillary line.      This is his PATIENT STATED HISTORY:(As transcribed by Technologist)  Patient complains of right and left lower quadrant pain and nausea. CONTRAST USED:  100cc of Omnipaque 350   FINDINGS:    LIVER:  Fatty infiltration liver is noted.   BILIARY:  No visible dilatation The past medical and past surgical history have been reviewed by me today.   Past Medical History:   Diagnosis Date   • Basal cell carcinoma of skin of face 6/24/15     left lower eye   • BCC (basal cell carcinoma), shoulder 12/10/15     left shoulder   • C •  Multiple Vitamin (TAB-A-CHUCHO) Oral Tab, Take 1 tablet by mouth daily. , Disp: , Rfl:   •  aspirin (ASPIRIN EC LOW DOSE) 81 MG Oral Tab EC, Take 81 mg by mouth daily. , Disp: , Rfl:   •  Triamterene-HCTZ 75-50 MG Oral Tab, Take 1 tablet by mouth daily. Kaylee Dill Clinical exam reveals his abdomen to be soft, nondistended, currently nontender. No guarding or rebound. No masses. No ascites. No inguinal or umbilical hernias are present. Is a very long left flank incision. The patient is obese.   Bowel sounds have Procedure Performed: Laparoscopic robotic low anterior resection of the rectosigmoid, with anastomosis.  Mobilization of the splenic flexure     Surgeon(s) and Role:     Edwin Yu MD - Primary  PA: Donna Olson PA-C  The assistance of  PA: Osito Following adequate general anesthesia, the patient was placed in the lithotomy position on the operating room table. The abdomen was scrubbed with chlorhexidine and sterile drapes were placed with wide exposure and xiphoid to pubis.   A rectal irrigation ca We found it necessary to completely mobilize the splenic flexure. This was done by coming around the splenic flexure with scissors dissection. This was carried to the level of the transverse omentum.   The entire left colon was mobilized from lateral to m We then took any sigmoid artery branch as necessary. Again this was all done at the level of the inferior mesenteric artery at its root. This freed up the segment of colon that was to be resected. This included the area of the active diverticulitis.   Marco A Sanchez EEA sizers were used to select the correct size instrument for staple anastomosis.     The EEA anvil was secured within the proximal cut colon with a pursestring suture of 2-0 Prolene suture.   This external operative site was then thoroughly irrigated.    All skin ports sites were closed with 5-0 undyed Vicryl in a subcuticular fashion. The extraction site was then closed with a deep layer of interrupted 3-0 Vicryl in a subcutaneous location.   The extraction site incision was then closed with a running 5-0 Patient has been evaluated and presents with no skilled Physical Therapy needs at this time.  Patient discharged from Physical Therapy services.  Please re-order if a new functional limitation presents during this admission.     2/2/2020:   Blood pressure 1154-MAR Hold     1631-MAR Unhold   1631-D/C'd         clindamycin (CLEOCIN) in D5W 900mg/50ml premix   Dose: 900 mg  Freq: Once Route: IV  Start: 01/31/20 1045 End: 01/31/20 1355    Order specific questions:   What infection is this being used to treat? Heparin Sodium (Porcine) 5000 UNIT/ML injection 5,000 Units   Dose: 5,000 Units  Freq:  Once Route: SC  Start: 01/31/20 1045 End: 01/31/20 1045 1045-Given             lisinopril tab 40 mg   Dose: 40 mg  Freq: Daily Route: OR  Start: 02/01/20 0930     (08 PRN Comment: less than or equal to 70 mg/dL, OR blood glucose  mg/dL with symptoms of hypoglycemia  Start: 01/31/20 1614 End: 01/31/20 1812 1812-D/C'd           Or  Glucose-Vitamin C (DEX-4) chewable tab 4 tablet   Dose: 4 tablet  Freq: Every 15 m Freq: As needed Route: OR  PRN Reason: mild pain  Start: 01/31/20 1614 End: 01/31/20 1812    1812-D/C'd           Or  HYDROcodone-acetaminophen (NORCO) 5-325 MG per tab 2 tablet   Dose: 2 tablet  Freq: As needed Route: OR  PRN Reason: moderate pain  Start:

## 2020-02-04 ENCOUNTER — PATIENT OUTREACH (OUTPATIENT)
Dept: FAMILY MEDICINE CLINIC | Facility: CLINIC | Age: 70
End: 2020-02-04

## 2020-02-04 DIAGNOSIS — Z01.00 DIABETIC EYE EXAM (HCC): Primary | ICD-10-CM

## 2020-02-04 DIAGNOSIS — E11.9 DIABETIC EYE EXAM (HCC): Primary | ICD-10-CM

## 2020-02-05 ENCOUNTER — TELEPHONE (OUTPATIENT)
Dept: FAMILY MEDICINE CLINIC | Facility: CLINIC | Age: 70
End: 2020-02-05

## 2020-02-05 ENCOUNTER — PATIENT OUTREACH (OUTPATIENT)
Dept: CASE MANAGEMENT | Age: 70
End: 2020-02-05

## 2020-02-05 DIAGNOSIS — K57.20 DIVERTICULITIS OF LARGE INTESTINE WITH PERFORATION WITHOUT BLEEDING: ICD-10-CM

## 2020-02-05 DIAGNOSIS — Z02.9 ENCOUNTERS FOR UNSPECIFIED ADMINISTRATIVE PURPOSE: ICD-10-CM

## 2020-02-05 PROCEDURE — 1111F DSCHRG MED/CURRENT MED MERGE: CPT

## 2020-02-05 NOTE — PROGRESS NOTES
Initial Post Discharge Follow Up   Discharge Date: 2/3/20  Contact Date: 2/5/2020    Consent Verification:  Assessment Completed With: Patient  HIPAA Verified?   Yes    Discharge Dx:    Diverticulitis of large intestine with perforation without bleeding, instructions with me and demonstrated drain care and documenting the drainage from the YAQUELIN drain.   • How well were your discharge instructions explained to you?   o On a scale of 1-5   1- Very Poor and 5- Very well   o Very well  • Do you have any questions effects reviewed? yes  o Do you have any questions about your new medication? No  • Did you  your discharge medications when you left the hospital? Yes  • May I go over your medications with you to make sure we are not missing anything? yes  • Are th appointments? No     NCM Reviewed upcoming Specialist Appt with patient     Yes, patient reports he has a HFU on 2/10/2020 with Lynnette Desouza PA-C in Dr. Brooklyn Carl office.      Interventions by KWAME: KWAME reviewed medications, discharge instructions, S&S

## 2020-02-05 NOTE — TELEPHONE ENCOUNTER
Patient was discharged home from [facility] and is at Moderate risk for readmission and recommended to have a TCM HFU by 2/17/2020 or sooner. KWAME attempted to schedule a TCM HFU patient states he has an appointment with Dr. Leonarda Goodman on 3/10/2020.  KWAME ex

## 2020-02-07 NOTE — PAYOR COMM NOTE
--------------  DISCHARGE REVIEW    Payor: Salvador German #:  [id number]  Authorization Number: [id number]    Admit date: 1/31/20  Admit time:  1812  Discharge Date: 2/3/2020  2:46 PM     Admitting Physician: Rashi Joseph MD  Attending Physic had cramps. Hong Germain had pain across his lower abdomen.  He had severe diarrhea followed by severe constipation.     Upon returning home he had some moderate symptoms with some improvement. Jose David Lady he got very sick on October 20, 2019. Hong Germain presented himself to  that long segment.  There is diverticulosis appears in the distal descending colon.  Please see the radiologist reading for all other findings not related to this portion of his intestine. Hospital Course:  The patient tolerated the above listed procedur Visits: Follow-up with Dr. Bushra Moore in approximately the next 7 days.      Other Discharge Instructions:    General diet  No lifting, no driving, the patient may shower  [place] for pain  Home with drain    Lynnette Desouza PA-C  2/3/2020  9:40 AM      Electron

## 2020-02-10 ENCOUNTER — OFFICE VISIT (OUTPATIENT)
Dept: SURGERY | Facility: CLINIC | Age: 70
End: 2020-02-10

## 2020-02-10 ENCOUNTER — MED REC SCAN ONLY (OUTPATIENT)
Dept: SURGERY | Facility: CLINIC | Age: 70
End: 2020-02-10

## 2020-02-10 VITALS — TEMPERATURE: 99 F | SYSTOLIC BLOOD PRESSURE: 154 MMHG | HEART RATE: 87 BPM | DIASTOLIC BLOOD PRESSURE: 68 MMHG

## 2020-02-10 DIAGNOSIS — E66.01 SEVERE OBESITY WITH BODY MASS INDEX (BMI) OF 35.0 TO 39.9 WITH COMORBIDITY (HCC): ICD-10-CM

## 2020-02-10 DIAGNOSIS — E11.49 TYPE II DIABETES MELLITUS WITH NEUROLOGICAL MANIFESTATIONS (HCC): ICD-10-CM

## 2020-02-10 DIAGNOSIS — K57.20 DIVERTICULITIS OF LARGE INTESTINE WITH PERFORATION WITHOUT BLEEDING: Primary | ICD-10-CM

## 2020-02-10 PROCEDURE — 99024 POSTOP FOLLOW-UP VISIT: CPT | Performed by: PHYSICIAN ASSISTANT

## 2020-02-10 NOTE — PROGRESS NOTES
Post Operative Visit Note       Active Problems  1. Diverticulitis of large intestine with perforation without bleeding    2. Type II diabetes mellitus with neurological manifestations ([facility] [address]    3.  Severe obesity with body mass index (BMI) of 35.0 to 39.9 w Sleep apnea    • Visual impairment     [id number] glasses     Past Surgical History:   Procedure Laterality Date   • ARTHROSCOPY OF JOINT UNLISTED Right     Meniscus repair right knee   • CATARACT Bilateral     IOL's   • COLONOSCOPY N/A 6/1/2018    Performed Sexual Activity      Alcohol use:  Yes        Alcohol/week: 1.0 standard drinks        Types: 1 Standard drinks or equivalent per week        Frequency: 2-4 times a month        Drinks per session: 1 or 2        Binge frequency: Never      Drug use: No    O throat and trouble swallowing. Respiratory: Negative for apnea, cough, shortness of breath and wheezing. Cardiovascular: Negative for chest pain, palpitations and leg swelling.    Gastrointestinal: Negative for abdominal distention, abdominal pain, an following a robotic low anterior resection of the rectosigmoid colon performed on January 31, 2020. The patient states that he is doing very well since having the procedure. He states he has been ambulating well and tolerating regular diet.   He states at this time. The patient verbalized understanding agreement plan of care. I have no further follow-up scheduled with this patient at this time. This patient can see me or Dr. Gagandeep Rivera on an as-needed basis.   This patient should return urgently for any

## 2020-02-10 NOTE — PATIENT INSTRUCTIONS
The patient presents today for continued care and evaluation following a robotic low anterior resection of the rectosigmoid colon performed on January 31, 2020. The patient states that he is doing very well since having the procedure.   He states he has malignancy. All the patient and his wife's questions were answered at this time. The patient verbalized understanding agreement plan of care. I have no further follow-up scheduled with this patient at this time.   This patient can see me or Dr. Michelle Cheema

## 2020-02-28 DIAGNOSIS — I10 BENIGN ESSENTIAL HTN: Primary | ICD-10-CM

## 2020-02-28 RX ORDER — LISINOPRIL 40 MG/1
40 TABLET ORAL DAILY
Qty: 90 TABLET | Refills: 0 | Status: SHIPPED | OUTPATIENT
Start: 2020-02-28 | End: 2020-05-18

## 2020-03-10 ENCOUNTER — OFFICE VISIT (OUTPATIENT)
Dept: FAMILY MEDICINE CLINIC | Facility: CLINIC | Age: 70
End: 2020-03-10
Payer: MEDICARE

## 2020-03-10 ENCOUNTER — TELEPHONE (OUTPATIENT)
Dept: FAMILY MEDICINE CLINIC | Facility: CLINIC | Age: 70
End: 2020-03-10

## 2020-03-10 VITALS
RESPIRATION RATE: 20 BRPM | WEIGHT: 238.81 LBS | HEIGHT: 68 IN | TEMPERATURE: 98 F | SYSTOLIC BLOOD PRESSURE: 126 MMHG | HEART RATE: 60 BPM | DIASTOLIC BLOOD PRESSURE: 78 MMHG | BODY MASS INDEX: 36.19 KG/M2

## 2020-03-10 DIAGNOSIS — K57.20 DIVERTICULITIS OF LARGE INTESTINE WITH PERFORATION WITHOUT BLEEDING: Primary | ICD-10-CM

## 2020-03-10 DIAGNOSIS — Z12.5 SCREENING FOR MALIGNANT NEOPLASM OF PROSTATE: ICD-10-CM

## 2020-03-10 DIAGNOSIS — E11.49 TYPE II DIABETES MELLITUS WITH NEUROLOGICAL MANIFESTATIONS (HCC): ICD-10-CM

## 2020-03-10 DIAGNOSIS — Z13.6 SCREENING FOR CARDIOVASCULAR CONDITION: ICD-10-CM

## 2020-03-10 DIAGNOSIS — R80.9 MICROALBUMINURIA DUE TO TYPE 2 DIABETES MELLITUS (HCC): ICD-10-CM

## 2020-03-10 DIAGNOSIS — R35.1 BPH ASSOCIATED WITH NOCTURIA: ICD-10-CM

## 2020-03-10 DIAGNOSIS — Z90.49 H/O PARTIAL RESECTION OF COLON: ICD-10-CM

## 2020-03-10 DIAGNOSIS — E11.29 MICROALBUMINURIA DUE TO TYPE 2 DIABETES MELLITUS (HCC): ICD-10-CM

## 2020-03-10 DIAGNOSIS — I10 BENIGN ESSENTIAL HTN: ICD-10-CM

## 2020-03-10 DIAGNOSIS — N40.1 BPH ASSOCIATED WITH NOCTURIA: ICD-10-CM

## 2020-03-10 LAB
CARTRIDGE LOT#: ABNORMAL NUMERIC
CREAT UR-SCNC: 167 MG/DL
HEMOGLOBIN A1C: 6.6 % (ref 4.3–5.6)
MICROALBUMIN UR-MCNC: 3.91 MG/DL
MICROALBUMIN/CREAT 24H UR-RTO: 23.4 UG/MG (ref ?–30)

## 2020-03-10 PROCEDURE — 82043 UR ALBUMIN QUANTITATIVE: CPT | Performed by: FAMILY MEDICINE

## 2020-03-10 PROCEDURE — 83036 HEMOGLOBIN GLYCOSYLATED A1C: CPT | Performed by: FAMILY MEDICINE

## 2020-03-10 PROCEDURE — 99214 OFFICE O/P EST MOD 30 MIN: CPT | Performed by: FAMILY MEDICINE

## 2020-03-10 PROCEDURE — 82570 ASSAY OF URINE CREATININE: CPT | Performed by: FAMILY MEDICINE

## 2020-03-10 NOTE — PROGRESS NOTES
Patient presents with: Follow - Up: F/u Diverticulitis     HPI:   Rosales Wheat is a 78year old male who presents to the office for 6 mo check up. Diverticulitis - rupture in 10/2019, s/p surgery 1/31/2020. Removed 14 inches of intestine.    Feels

## 2020-03-10 NOTE — TELEPHONE ENCOUNTER
Please enter lab orders for the patient's upcoming physical appointment. Physical scheduled:    Your appointments     Date & Time Appointment Department [facility])    Sep 21, 2020 10:30 AM CDT MA Supervisit with MD Danny Campos 26,

## 2020-05-18 DIAGNOSIS — I10 BENIGN ESSENTIAL HTN: ICD-10-CM

## 2020-05-18 RX ORDER — LISINOPRIL 40 MG/1
40 TABLET ORAL DAILY
Qty: 90 TABLET | Refills: 0 | Status: SHIPPED | OUTPATIENT
Start: 2020-05-18 | End: 2020-08-20

## 2020-05-18 NOTE — TELEPHONE ENCOUNTER
Requested Prescriptions     Pending Prescriptions Disp Refills   • lisinopril 40 MG Oral Tab 90 tablet 0     Sig: Take 1 tablet (40 mg total) by mouth daily.      LOV 3/10/2020         Appointment scheduled: 9/21/2020 Aleena Barba MD     Medication ref

## 2020-05-26 ENCOUNTER — APPOINTMENT (OUTPATIENT)
Dept: CT IMAGING | Facility: HOSPITAL | Age: 70
End: 2020-05-26
Attending: EMERGENCY MEDICINE
Payer: MEDICARE

## 2020-05-26 ENCOUNTER — HOSPITAL ENCOUNTER (EMERGENCY)
Facility: HOSPITAL | Age: 70
Discharge: HOME | End: 2020-05-26
Attending: EMERGENCY MEDICINE
Payer: MEDICARE

## 2020-05-26 VITALS
SYSTOLIC BLOOD PRESSURE: 128 MMHG | OXYGEN SATURATION: 98 % | WEIGHT: 240 LBS | HEART RATE: 57 BPM | BODY MASS INDEX: 36 KG/M2 | TEMPERATURE: 97 F | DIASTOLIC BLOOD PRESSURE: 82 MMHG | RESPIRATION RATE: 16 BRPM

## 2020-05-26 DIAGNOSIS — N23 RENAL COLIC: ICD-10-CM

## 2020-05-26 DIAGNOSIS — N20.0 KIDNEY STONE: Primary | ICD-10-CM

## 2020-05-26 PROCEDURE — 74176 CT ABD & PELVIS W/O CONTRAST: CPT | Performed by: EMERGENCY MEDICINE

## 2020-05-26 PROCEDURE — 99284 EMERGENCY DEPT VISIT MOD MDM: CPT

## 2020-05-26 PROCEDURE — 85025 COMPLETE CBC W/AUTO DIFF WBC: CPT | Performed by: EMERGENCY MEDICINE

## 2020-05-26 PROCEDURE — 96361 HYDRATE IV INFUSION ADD-ON: CPT

## 2020-05-26 PROCEDURE — 81001 URINALYSIS AUTO W/SCOPE: CPT | Performed by: EMERGENCY MEDICINE

## 2020-05-26 PROCEDURE — 80053 COMPREHEN METABOLIC PANEL: CPT | Performed by: EMERGENCY MEDICINE

## 2020-05-26 PROCEDURE — 96374 THER/PROPH/DIAG INJ IV PUSH: CPT

## 2020-05-26 RX ORDER — ONDANSETRON 4 MG/1
4 TABLET, ORALLY DISINTEGRATING ORAL EVERY 4 HOURS PRN
Qty: 10 TABLET | Refills: 0 | Status: SHIPPED | OUTPATIENT
Start: 2020-05-26 | End: 2020-06-02

## 2020-05-26 RX ORDER — ONDANSETRON 2 MG/ML
4 INJECTION INTRAMUSCULAR; INTRAVENOUS ONCE
Status: DISCONTINUED | OUTPATIENT
Start: 2020-05-26 | End: 2020-05-26

## 2020-05-26 RX ORDER — HYDROCODONE BITARTRATE AND ACETAMINOPHEN 5; 325 MG/1; MG/1
1-2 TABLET ORAL EVERY 4 HOURS PRN
Qty: 20 TABLET | Refills: 0 | Status: SHIPPED | OUTPATIENT
Start: 2020-05-26 | End: 2020-06-09

## 2020-05-26 RX ORDER — KETOROLAC TROMETHAMINE 30 MG/ML
15 INJECTION, SOLUTION INTRAMUSCULAR; INTRAVENOUS ONCE
Status: COMPLETED | OUTPATIENT
Start: 2020-05-26 | End: 2020-05-26

## 2020-05-26 RX ORDER — TAMSULOSIN HYDROCHLORIDE 0.4 MG/1
0.4 CAPSULE ORAL DAILY
Qty: 7 CAPSULE | Refills: 0 | Status: SHIPPED | OUTPATIENT
Start: 2020-05-26 | End: 2020-06-09 | Stop reason: ALTCHOICE

## 2020-05-26 NOTE — ED PROVIDER NOTES
Patient Seen in: [facility] Emergency Department      History   Patient presents with:  Abdomen/Flank Pain    Stated Complaint: R SIDED ABD PAIN    HPI    25-year-old male presents emergency department complaint of right flank pain since last night. [facility] ENDOSCOPY   • FLEXIBLE SIGMOIDOSCOPY N/A 10/12/2016    Performed by Leann Griffith DO at [facility] ENDOSCOPY   • FRACTURE SURGERY Left 2010    repair shattered L heel - Metal bolt   • LESION WIDE EXCISION WITH SKIN GRAFT Left 8/26/2015    Performed by Dexter Bloch, rate and rhythm no murmur rub  Respiratory: Clear to auscultation bilaterally no crackles no wheezes no accessory muscle use  Abdomen: Soft nontender nondistended, no rebound no guarding  no hepatosplenomegaly bowel sounds are present , no pulsatile mass Zofran.     Ct Abdomen+pelvis Kidneystone 2d Rndr(no Iv,no Oral)(cpt=74176)    Result Date: 5/26/2020  PROCEDURE:  CT ABDOMEN+PELVIS KIDNEYSTONE 2D RNDR(NO IV,NO ORAL)(CPT=74176)  COMPARISON:  [facility] CT, CT ABDOMEN+PELVIS(CONTRAST ONLY)(CPT=74177), 10/20/ confluence of the fissure lines of the minor and major fissure measuring 6 mm. This was present on the prior CT scan and is unchanged.   A smaller subpleural right lower lobe nodule about 3 mm image 12 is also unchanged, as is a similar size middle lobe no Tablet Dispersible  Take 1 tablet (4 mg total) by mouth every 4 (four) hours as needed for Nausea. Qty: 10 tablet Refills: 0    tamsulosin (FLOMAX) cap  Take 1 capsule (0.4 mg total) by mouth daily.   Qty: 7 capsule Refills: 0

## 2020-06-09 ENCOUNTER — TELEPHONE (OUTPATIENT)
Dept: FAMILY MEDICINE CLINIC | Facility: CLINIC | Age: 70
End: 2020-06-09

## 2020-06-09 ENCOUNTER — OFFICE VISIT (OUTPATIENT)
Dept: FAMILY MEDICINE CLINIC | Facility: CLINIC | Age: 70
End: 2020-06-09
Payer: MEDICARE

## 2020-06-09 VITALS
BODY MASS INDEX: 36.92 KG/M2 | RESPIRATION RATE: 16 BRPM | SYSTOLIC BLOOD PRESSURE: 138 MMHG | WEIGHT: 240.81 LBS | HEART RATE: 88 BPM | DIASTOLIC BLOOD PRESSURE: 84 MMHG | HEIGHT: 67.75 IN | TEMPERATURE: 99 F

## 2020-06-09 DIAGNOSIS — Z99.89 OSA ON CPAP: ICD-10-CM

## 2020-06-09 DIAGNOSIS — N20.1 CALCULUS, URETER: ICD-10-CM

## 2020-06-09 DIAGNOSIS — G47.33 OSA ON CPAP: ICD-10-CM

## 2020-06-09 DIAGNOSIS — Z01.818 PREOP EXAMINATION: Primary | ICD-10-CM

## 2020-06-09 DIAGNOSIS — I10 BENIGN ESSENTIAL HTN: ICD-10-CM

## 2020-06-09 DIAGNOSIS — E11.9 TYPE II DIABETES MELLITUS, WELL CONTROLLED (HCC): ICD-10-CM

## 2020-06-09 PROCEDURE — 99214 OFFICE O/P EST MOD 30 MIN: CPT | Performed by: FAMILY MEDICINE

## 2020-06-09 NOTE — PROGRESS NOTES
Patient presents with:  Pre-Op Exam: R Ureteroscopy By  at [place] [address] in [place]    HPI:   Alexandre Pierson is a 78year old male who is being evaluated for pre-surgical clearance at the request of Dr. Nilson Irizarry.    Surgery: Uteroscopy - R  Boyd HIVES  Levaquin [Levofloxa*    MYALGIA  Morphine                NAUSEA AND VOMITING    Comment:Derivatives  Penicillins             RASH    Past Surgical History:   Procedure Laterality Date   • Arthroscopy of joint unlisted Right     Meniscus repair right negative  Respiratory: negative  Cardiovascular: negative  Gastrointestinal: negative  Genitourinary: negative  Neurological: negative     EXAM:   /84   Pulse 88   Temp 98.5 °F (36.9 °C)   Resp 16   Ht 67.75\"   Wt 240 lb 12.8 oz (109.2 kg)   BMI 36. (H)    Lymphocytes Absolute      1.00 - 4.00 x10(3) uL  1.59    Monocytes Absolute      0.10 - 1.00 x10(3) uL  1.10 (H)    Eosinophils Absolute      0.00 - 0.70 x10(3) uL  0.09    Basophils Absolute      0.00 - 0.20 x10(3) uL  0.03    Immature Granulocyte

## 2020-06-09 NOTE — TELEPHONE ENCOUNTER
Pt calling he had a kidney stone and was in the hospital and now  Dr Nakita Jorgensen wants to do a ureteroscopy on Thursday they want to do this ASAP because that there is some blood work that shows he is having some serious kidney issues, but he needs a pre op to

## 2020-08-08 DIAGNOSIS — I10 ESSENTIAL HYPERTENSION, BENIGN: ICD-10-CM

## 2020-08-10 RX ORDER — TRIAMTERENE AND HYDROCHLOROTHIAZIDE 75; 50 MG/1; MG/1
TABLET ORAL
Qty: 90 TABLET | Refills: 0 | Status: SHIPPED | OUTPATIENT
Start: 2020-08-10 | End: 2020-11-04

## 2020-08-10 NOTE — TELEPHONE ENCOUNTER
Requested Prescriptions     Pending Prescriptions Disp Refills   • TRIAMTERENE-HCTZ 75-50 MG Oral Tab [Pharmacy Med Name: TRIAMTERENE-HCTZ 75-50 MG TAB] 90 tablet 0     Sig: TAKE 1 TABLET BY MOUTH EVERY DAY     LOV 6/9/2020 (preop)  HTN visit 3/10/2020  La

## 2020-08-19 DIAGNOSIS — I10 BENIGN ESSENTIAL HTN: ICD-10-CM

## 2020-08-20 RX ORDER — LISINOPRIL 40 MG/1
TABLET ORAL
Qty: 90 TABLET | Refills: 0 | Status: SHIPPED | OUTPATIENT
Start: 2020-08-20 | End: 2020-11-16

## 2020-08-20 NOTE — TELEPHONE ENCOUNTER
Requested Prescriptions     Pending Prescriptions Disp Refills   • LISINOPRIL 40 MG Oral Tab [Pharmacy Med Name: LISINOPRIL 40 MG TABLET] 90 tablet 0     Sig: TAKE 1 TABLET BY MOUTH EVERY DAY     LOV 6/9/2020         Appointment scheduled: 9/21/2020 Josey Wheeler

## 2020-09-09 ENCOUNTER — TELEPHONE (OUTPATIENT)
Dept: FAMILY MEDICINE CLINIC | Facility: CLINIC | Age: 70
End: 2020-09-09

## 2020-09-09 DIAGNOSIS — Z12.5 SCREENING FOR MALIGNANT NEOPLASM OF PROSTATE: ICD-10-CM

## 2020-09-09 DIAGNOSIS — R35.1 BPH ASSOCIATED WITH NOCTURIA: ICD-10-CM

## 2020-09-09 DIAGNOSIS — E11.29 MICROALBUMINURIA DUE TO TYPE 2 DIABETES MELLITUS (HCC): ICD-10-CM

## 2020-09-09 DIAGNOSIS — R80.9 MICROALBUMINURIA DUE TO TYPE 2 DIABETES MELLITUS (HCC): ICD-10-CM

## 2020-09-09 DIAGNOSIS — N40.1 BPH ASSOCIATED WITH NOCTURIA: ICD-10-CM

## 2020-09-09 DIAGNOSIS — I10 BENIGN ESSENTIAL HTN: Primary | ICD-10-CM

## 2020-09-09 DIAGNOSIS — E11.49 TYPE II DIABETES MELLITUS WITH NEUROLOGICAL MANIFESTATIONS (HCC): ICD-10-CM

## 2020-09-09 DIAGNOSIS — Z13.6 SCREENING FOR CARDIOVASCULAR CONDITION: ICD-10-CM

## 2020-09-09 NOTE — TELEPHONE ENCOUNTER
;  Please enter lab orders for the patient's upcoming physical appointment. Patient's lab orders from 3/10/2020 . Pt needs new orders. Please route to front staff once orders re entered. Physical scheduled:    Your appointments     Date & Tres Hamm

## 2020-09-12 ENCOUNTER — TELEPHONE (OUTPATIENT)
Dept: FAMILY MEDICINE CLINIC | Facility: CLINIC | Age: 70
End: 2020-09-12

## 2020-09-14 DIAGNOSIS — I10 ESSENTIAL HYPERTENSION, BENIGN: ICD-10-CM

## 2020-09-14 RX ORDER — METOPROLOL SUCCINATE 50 MG/1
TABLET, EXTENDED RELEASE ORAL
Qty: 90 TABLET | Refills: 0 | Status: SHIPPED | OUTPATIENT
Start: 2020-09-14 | End: 2020-12-11

## 2020-09-14 NOTE — TELEPHONE ENCOUNTER
Requested Prescriptions     Pending Prescriptions Disp Refills   • METOPROLOL SUCCINATE ER 50 MG Oral Tablet 24 Hr [Pharmacy Med Name: METOPROLOL SUCC ER 50 MG TAB] 90 tablet 1     Sig: TAKE 1 TABLET BY MOUTH EVERY DAY     LOV 6/9/2020         Appointment

## 2020-09-16 LAB
% FREE PSA: 26 % (CALC)
ALBUMIN/GLOBULIN RATIO: 1.9 (CALC) (ref 1–2.5)
ALBUMIN: 4.2 G/DL (ref 3.6–5.1)
ALKALINE PHOSPHATASE: 55 U/L (ref 35–144)
ALT: 31 U/L (ref 9–46)
AST: 26 U/L (ref 10–35)
BILIRUBIN, TOTAL: 0.6 MG/DL (ref 0.2–1.2)
BUN: 22 MG/DL (ref 7–25)
CALCIUM: 9.5 MG/DL (ref 8.6–10.3)
CARBON DIOXIDE: 26 MMOL/L (ref 20–32)
CHLORIDE: 103 MMOL/L (ref 98–110)
CHOL/HDLC RATIO: 3.6 (CALC)
CHOLESTEROL, TOTAL: 150 MG/DL
CREATININE, RANDOM URINE: 107 MG/DL (ref 20–320)
CREATININE: 1.02 MG/DL (ref 0.7–1.18)
EGFR IF AFRICN AM: 86 ML/MIN/1.73M2
EGFR IF NONAFRICN AM: 74 ML/MIN/1.73M2
FREE PSA: 1.1 NG/ML
GLOBULIN: 2.2 G/DL (CALC) (ref 1.9–3.7)
GLUCOSE: 121 MG/DL (ref 65–99)
HDL CHOLESTEROL: 42 MG/DL
HEMOGLOBIN A1C: 6.6 % OF TOTAL HGB
LDL-CHOLESTEROL: 89 MG/DL (CALC)
MICROALBUMIN/CREATININE RATIO, RANDOM URINE: 53 MCG/MG CREAT
MICROALBUMIN: 5.7 MG/DL
NON-HDL CHOLESTEROL: 108 MG/DL (CALC)
POTASSIUM: 4.1 MMOL/L (ref 3.5–5.3)
PROTEIN, TOTAL: 6.4 G/DL (ref 6.1–8.1)
SODIUM: 139 MMOL/L (ref 135–146)
TOTAL PSA: 4.3 NG/ML
TRIGLYCERIDES: 95 MG/DL

## 2020-09-21 ENCOUNTER — OFFICE VISIT (OUTPATIENT)
Dept: FAMILY MEDICINE CLINIC | Facility: CLINIC | Age: 70
End: 2020-09-21
Payer: MEDICARE

## 2020-09-21 VITALS
HEIGHT: 67.5 IN | SYSTOLIC BLOOD PRESSURE: 136 MMHG | BODY MASS INDEX: 38.1 KG/M2 | WEIGHT: 245.63 LBS | RESPIRATION RATE: 16 BRPM | HEART RATE: 68 BPM | TEMPERATURE: 98 F | DIASTOLIC BLOOD PRESSURE: 82 MMHG

## 2020-09-21 DIAGNOSIS — R35.1 BPH ASSOCIATED WITH NOCTURIA: ICD-10-CM

## 2020-09-21 DIAGNOSIS — E11.49 TYPE II DIABETES MELLITUS WITH NEUROLOGICAL MANIFESTATIONS (HCC): ICD-10-CM

## 2020-09-21 DIAGNOSIS — E11.29 MICROALBUMINURIA DUE TO TYPE 2 DIABETES MELLITUS (HCC): ICD-10-CM

## 2020-09-21 DIAGNOSIS — Z00.00 ENCOUNTER FOR ANNUAL HEALTH EXAMINATION: Primary | ICD-10-CM

## 2020-09-21 DIAGNOSIS — Z23 NEED FOR VACCINATION: ICD-10-CM

## 2020-09-21 DIAGNOSIS — R80.9 MICROALBUMINURIA DUE TO TYPE 2 DIABETES MELLITUS (HCC): ICD-10-CM

## 2020-09-21 DIAGNOSIS — G47.33 OSA ON CPAP: ICD-10-CM

## 2020-09-21 DIAGNOSIS — I10 BENIGN ESSENTIAL HTN: ICD-10-CM

## 2020-09-21 DIAGNOSIS — Z85.828 HISTORY OF BASAL CELL CARCINOMA OF EYELID: ICD-10-CM

## 2020-09-21 DIAGNOSIS — Z99.89 OSA ON CPAP: ICD-10-CM

## 2020-09-21 DIAGNOSIS — N40.1 BPH ASSOCIATED WITH NOCTURIA: ICD-10-CM

## 2020-09-21 DIAGNOSIS — E66.01 CLASS 2 SEVERE OBESITY DUE TO EXCESS CALORIES WITH SERIOUS COMORBIDITY AND BODY MASS INDEX (BMI) OF 37.0 TO 37.9 IN ADULT (HCC): ICD-10-CM

## 2020-09-21 DIAGNOSIS — M47.816 ARTHRITIS OF LUMBAR SPINE: ICD-10-CM

## 2020-09-21 DIAGNOSIS — R97.20 ELEVATED PSA, LESS THAN 10 NG/ML: ICD-10-CM

## 2020-09-21 PROBLEM — K57.20 DIVERTICULITIS OF LARGE INTESTINE WITH PERFORATION WITHOUT BLEEDING: Status: RESOLVED | Noted: 2019-10-20 | Resolved: 2020-09-21

## 2020-09-21 PROCEDURE — 99397 PER PM REEVAL EST PAT 65+ YR: CPT | Performed by: FAMILY MEDICINE

## 2020-09-21 PROCEDURE — 3008F BODY MASS INDEX DOCD: CPT | Performed by: FAMILY MEDICINE

## 2020-09-21 PROCEDURE — G0009 ADMIN PNEUMOCOCCAL VACCINE: HCPCS | Performed by: FAMILY MEDICINE

## 2020-09-21 PROCEDURE — 3079F DIAST BP 80-89 MM HG: CPT | Performed by: FAMILY MEDICINE

## 2020-09-21 PROCEDURE — 3075F SYST BP GE 130 - 139MM HG: CPT | Performed by: FAMILY MEDICINE

## 2020-09-21 PROCEDURE — 96160 PT-FOCUSED HLTH RISK ASSMT: CPT | Performed by: FAMILY MEDICINE

## 2020-09-21 PROCEDURE — 90732 PPSV23 VACC 2 YRS+ SUBQ/IM: CPT | Performed by: FAMILY MEDICINE

## 2020-09-21 PROCEDURE — G0439 PPPS, SUBSEQ VISIT: HCPCS | Performed by: FAMILY MEDICINE

## 2020-09-21 NOTE — PROGRESS NOTES
HPI:   Saima Juan is a 78year old male who presents for a MA (Medicare Advantage) Supervisit (Once per calendar year). Preventative Screening  Colonoscopy - diverticulosis s/p c-scope and surgery Jan 2020. Next due in 2021.    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     He  has a past surgical history that includes removal of kidney stone (1980); skin mazariegos Both Eyes Chart Acuity: 20/20   Able To Tolerate Visual Acuity: Yes      General Appearance:  Alert, cooperative, no distress, appears stated age   Head:  Normocephalic, without obvious abnormality, atraumatic   Eyes:  PERRL, conjunctiva/corneas clear, EOM ([facility] [address]  Need weight loss  Healthier diet a must.  Exercise as tolerated. 3. Elevated PSA, less than 10 ng/ml  4. BPH associated with nocturia  Elevated on labs  Will repeat PSA in af ew months.      5. Need for vaccination  given  - PNEUMOCOCCAL IMM (PNEU (H)       No flowsheet data found.     Fasting Blood Sugar (FSB)Annually GLUCOSE (mg/dL)   Date Value   09/15/2020 121 (H)       Cardiovascular Disease Screening     LDL Annually LDL-CHOLESTEROL (mg/dL (calc))   Date Value   09/15/2020 89        EKG - w/ In found This may be covered with your pharmacy  prescription benefits      SPECIFIC DISEASE MONITORING Internal Lab or Procedure External Lab or Procedure      Annual Monitoring of Persistent     Medications (ACE/ARB, digoxin diuretics, anticonvulsants.)

## 2020-09-21 NOTE — PATIENT INSTRUCTIONS
Akira Rasheed's SCREENING SCHEDULE   Tests on this list are recommended by your physician but may not be covered, or covered at this frequency, by your insurer. Please check with your insurance carrier before scheduling to verify coverage.     PREVENTATI Colorectal Cancer Screening Covered up to Age 76     Colonoscopy Screen   Covered every 10 years- more often if abnormal Colonoscopy due on 06/01/2021 Update Health Maintenance if applicable    Flex Sigmoidoscopy Screen  Covered every 5 years No results covered with a cut with metal- TD and TDaP Not covered by Medicare Part B) No orders found for this or any previous visit.  This may be covered with your prescription benefits, but Medicare does not cover unless Medically needed    Zoster (Not covered by Me

## 2020-10-06 PROBLEM — L30.4 INTERTRIGO: Status: ACTIVE | Noted: 2020-10-06

## 2020-10-06 PROBLEM — L82.1 OTHER SEBORRHEIC KERATOSIS: Status: ACTIVE | Noted: 2020-10-06

## 2020-11-01 DIAGNOSIS — I10 ESSENTIAL HYPERTENSION, BENIGN: ICD-10-CM

## 2020-11-04 RX ORDER — TRIAMTERENE AND HYDROCHLOROTHIAZIDE 75; 50 MG/1; MG/1
TABLET ORAL
Qty: 90 TABLET | Refills: 0 | Status: SHIPPED | OUTPATIENT
Start: 2020-11-04 | End: 2021-02-01

## 2020-11-04 NOTE — TELEPHONE ENCOUNTER
Requested Prescriptions     Pending Prescriptions Disp Refills   • TRIAMTERENE-HCTZ 75-50 MG Oral Tab [Pharmacy Med Name: TRIAMTERENE-HCTZ 75-50 MG TAB] 90 tablet 0     Sig: TAKE 1 TABLET BY MOUTH EVERY DAY     LOV 9/21/2020     Patient was asked to follow

## 2020-11-16 DIAGNOSIS — I10 BENIGN ESSENTIAL HTN: ICD-10-CM

## 2020-11-16 RX ORDER — LISINOPRIL 40 MG/1
TABLET ORAL
Qty: 90 TABLET | Refills: 0 | Status: SHIPPED | OUTPATIENT
Start: 2020-11-16 | End: 2021-02-16

## 2020-11-16 NOTE — TELEPHONE ENCOUNTER
Requested Prescriptions     Pending Prescriptions Disp Refills   • LISINOPRIL 40 MG Oral Tab [Pharmacy Med Name: LISINOPRIL 40 MG TABLET] 90 tablet 0     Sig: TAKE 1 TABLET BY MOUTH EVERY DAY     LOV 9/21/2020     Patient was asked to follow-up in: 1 year

## 2020-12-11 DIAGNOSIS — I10 ESSENTIAL HYPERTENSION, BENIGN: ICD-10-CM

## 2020-12-11 RX ORDER — METOPROLOL SUCCINATE 50 MG/1
TABLET, EXTENDED RELEASE ORAL
Qty: 90 TABLET | Refills: 0 | Status: SHIPPED | OUTPATIENT
Start: 2020-12-11 | End: 2021-03-09

## 2020-12-11 NOTE — TELEPHONE ENCOUNTER
Requested Prescriptions     Pending Prescriptions Disp Refills   • METOPROLOL SUCCINATE ER 50 MG Oral Tablet 24 Hr [Pharmacy Med Name: METOPROLOL SUCC ER 50 MG TAB] 90 tablet 0     Sig: TAKE 1 TABLET BY MOUTH EVERY DAY     LOV 9/21/2020     Patient was ask

## 2021-01-19 ENCOUNTER — PATIENT MESSAGE (OUTPATIENT)
Dept: FAMILY MEDICINE CLINIC | Facility: CLINIC | Age: 71
End: 2021-01-19

## 2021-01-19 NOTE — TELEPHONE ENCOUNTER
From: Edward Griffiths  To: Radha Muhammad MD  Sent: 2021 1:39 PM CST  Subject: Other    Dr. Derrek Fields:    I am currently on a medical freeze status for my [facility] membership.  This will  on 2021 but I need to extend the freeze be

## 2021-01-31 DIAGNOSIS — I10 ESSENTIAL HYPERTENSION, BENIGN: ICD-10-CM

## 2021-02-01 RX ORDER — TRIAMTERENE AND HYDROCHLOROTHIAZIDE 75; 50 MG/1; MG/1
TABLET ORAL
Qty: 90 TABLET | Refills: 0 | Status: SHIPPED | OUTPATIENT
Start: 2021-02-01 | End: 2021-04-30

## 2021-02-03 DIAGNOSIS — Z23 NEED FOR VACCINATION: ICD-10-CM

## 2021-02-06 ENCOUNTER — IMMUNIZATION (OUTPATIENT)
Dept: LAB | Age: 71
End: 2021-02-06
Attending: HOSPITALIST
Payer: MEDICARE

## 2021-02-06 DIAGNOSIS — Z23 NEED FOR VACCINATION: Primary | ICD-10-CM

## 2021-02-06 PROCEDURE — 0001A SARSCOV2 VAC 30MCG/0.3ML IM: CPT

## 2021-02-15 DIAGNOSIS — I10 BENIGN ESSENTIAL HTN: ICD-10-CM

## 2021-02-16 RX ORDER — LISINOPRIL 40 MG/1
TABLET ORAL
Qty: 90 TABLET | Refills: 0 | Status: SHIPPED | OUTPATIENT
Start: 2021-02-16 | End: 2021-05-17

## 2021-02-16 RX ORDER — LISINOPRIL 40 MG/1
40 TABLET ORAL DAILY
Qty: 90 TABLET | Refills: 0 | OUTPATIENT
Start: 2021-02-16

## 2021-02-27 ENCOUNTER — IMMUNIZATION (OUTPATIENT)
Dept: LAB | Age: 71
End: 2021-02-27
Attending: HOSPITALIST
Payer: MEDICARE

## 2021-02-27 DIAGNOSIS — Z23 NEED FOR VACCINATION: Primary | ICD-10-CM

## 2021-02-27 PROCEDURE — 0002A SARSCOV2 VAC 30MCG/0.3ML IM: CPT

## 2021-03-09 DIAGNOSIS — I10 ESSENTIAL HYPERTENSION, BENIGN: ICD-10-CM

## 2021-03-09 RX ORDER — METOPROLOL SUCCINATE 50 MG/1
TABLET, EXTENDED RELEASE ORAL
Qty: 90 TABLET | Refills: 0 | Status: SHIPPED | OUTPATIENT
Start: 2021-03-09 | End: 2021-06-14

## 2021-04-29 DIAGNOSIS — I10 ESSENTIAL HYPERTENSION, BENIGN: ICD-10-CM

## 2021-04-30 RX ORDER — TRIAMTERENE AND HYDROCHLOROTHIAZIDE 75; 50 MG/1; MG/1
TABLET ORAL
Qty: 90 TABLET | Refills: 0 | Status: SHIPPED | OUTPATIENT
Start: 2021-04-30

## 2021-05-15 DIAGNOSIS — I10 BENIGN ESSENTIAL HTN: ICD-10-CM

## 2021-05-17 RX ORDER — LISINOPRIL 40 MG/1
TABLET ORAL
Qty: 90 TABLET | Refills: 0 | Status: SHIPPED | OUTPATIENT
Start: 2021-05-17 | End: 2021-08-13

## 2021-06-06 DIAGNOSIS — I10 ESSENTIAL HYPERTENSION, BENIGN: ICD-10-CM

## 2021-06-07 ENCOUNTER — LAB ENCOUNTER (OUTPATIENT)
Dept: LAB | Age: 71
End: 2021-06-07
Attending: INTERNAL MEDICINE
Payer: MEDICARE

## 2021-06-07 DIAGNOSIS — Z86.010 PERSONAL HISTORY OF COLONIC POLYPS: ICD-10-CM

## 2021-06-08 ENCOUNTER — ANESTHESIA EVENT (OUTPATIENT)
Dept: ENDOSCOPY | Facility: HOSPITAL | Age: 71
End: 2021-06-08
Payer: MEDICARE

## 2021-06-08 NOTE — ANESTHESIA PREPROCEDURE EVALUATION
PRE-OP EVALUATION    Patient Name: Rosales Wheat    Admit Diagnosis: Personal history of colonic polyps [Z86.010]    Pre-op Diagnosis: Personal history of colonic polyps [Z86.010]    COLONOSCOPY    Anesthesia Procedure: COLONOSCOPY (N/A )    Surgeon(s) an 5/17/2017    Procedure: COLONOSCOPY;  Surgeon: Melvin Omalley DO;  Location: [facility] ENDOSCOPY   • COLONOSCOPY N/A 6/1/2018    Procedure: COLONOSCOPY;  Surgeon: Melvin Omalley DO;  Location: [facility] ENDOSCOPY   • EGD N/A 9/23/2016    Procedure: Joshua Ocasio

## 2021-06-09 ENCOUNTER — HOSPITAL ENCOUNTER (OUTPATIENT)
Facility: HOSPITAL | Age: 71
Setting detail: HOSPITAL OUTPATIENT SURGERY
Discharge: HOME | End: 2021-06-09
Attending: INTERNAL MEDICINE | Admitting: INTERNAL MEDICINE
Payer: MEDICARE

## 2021-06-09 ENCOUNTER — ANESTHESIA (OUTPATIENT)
Dept: ENDOSCOPY | Facility: HOSPITAL | Age: 71
End: 2021-06-09
Payer: MEDICARE

## 2021-06-09 VITALS
HEIGHT: 69 IN | RESPIRATION RATE: 20 BRPM | DIASTOLIC BLOOD PRESSURE: 66 MMHG | HEART RATE: 57 BPM | BODY MASS INDEX: 35.55 KG/M2 | TEMPERATURE: 97 F | OXYGEN SATURATION: 96 % | SYSTOLIC BLOOD PRESSURE: 115 MMHG | WEIGHT: 240 LBS

## 2021-06-09 DIAGNOSIS — Z86.010 PERSONAL HISTORY OF COLONIC POLYPS: Primary | ICD-10-CM

## 2021-06-09 PROCEDURE — 82962 GLUCOSE BLOOD TEST: CPT

## 2021-06-09 PROCEDURE — 0DBK8ZX EXCISION OF ASCENDING COLON, VIA NATURAL OR ARTIFICIAL OPENING ENDOSCOPIC, DIAGNOSTIC: ICD-10-PCS | Performed by: INTERNAL MEDICINE

## 2021-06-09 PROCEDURE — 88305 TISSUE EXAM BY PATHOLOGIST: CPT | Performed by: INTERNAL MEDICINE

## 2021-06-09 RX ORDER — SODIUM CHLORIDE, SODIUM LACTATE, POTASSIUM CHLORIDE, CALCIUM CHLORIDE 600; 310; 30; 20 MG/100ML; MG/100ML; MG/100ML; MG/100ML
INJECTION, SOLUTION INTRAVENOUS CONTINUOUS
Status: DISCONTINUED | OUTPATIENT
Start: 2021-06-09 | End: 2021-06-09

## 2021-06-09 RX ORDER — LIDOCAINE HYDROCHLORIDE 10 MG/ML
INJECTION, SOLUTION EPIDURAL; INFILTRATION; INTRACAUDAL; PERINEURAL AS NEEDED
Status: DISCONTINUED | OUTPATIENT
Start: 2021-06-09 | End: 2021-06-09 | Stop reason: SURG

## 2021-06-09 RX ADMIN — SODIUM CHLORIDE, SODIUM LACTATE, POTASSIUM CHLORIDE, CALCIUM CHLORIDE: 600; 310; 30; 20 INJECTION, SOLUTION INTRAVENOUS at 08:14:00

## 2021-06-09 RX ADMIN — LIDOCAINE HYDROCHLORIDE 50 MG: 10 INJECTION, SOLUTION EPIDURAL; INFILTRATION; INTRACAUDAL; PERINEURAL at 08:18:00

## 2021-06-09 NOTE — H&P
History & Physical Examination    Patient Name: Edward Griffiths  MRN: [id number]  CSN: [id number]  YOB: 1950    Diagnosis: history of large rectosigmoid polyp s/p EMR    Present Illness: 70 y/o M history as above presents for surveillance colon ENDOSCOPY   • FRACTURE SURGERY Left 2010    repair shattered L heel - Metal bolt   • REMOVAL OF KIDNEY STONE  1980   • SKIN SURGERY  8-19-15    Mohs surgery for [address] to left lower eyelid   • SKIN SURGERY      Removal BCC right side of face and left upper ba

## 2021-06-09 NOTE — ANESTHESIA POSTPROCEDURE EVALUATION
[address] Patient Status:  Hospital Outpatient Surgery   Age/Gender 69year old male MRN [id number]   Location [address] [facility] 28 Attending Melvin Omalley, [address] [facility] Day # 0 PCP MD Poly Guo

## 2021-06-09 NOTE — OPERATIVE REPORT
[facility]  Patient Status:  Hospital Outpatient Surgery    1950 MRN [id number]   Location [id number] [place] [address] Attending Mona Jamil, [address] [facility] Day # 0 PCP Michiel Ormond, MD       PREOPERATIVE DIAGNOSIS/INDICATION polypectomy. Diverticulosis in the ascending colon. The colo-colonic anastomosis appeared normal. Retroflexion revealed internal hemorrhoids in the rectum. IMPRESSION:   1. A 5 mm polyp. Removed. 2. Diverticulosis. RECOMMENDATIONS:   1.  Await p

## 2021-06-10 DIAGNOSIS — I10 ESSENTIAL HYPERTENSION, BENIGN: ICD-10-CM

## 2021-06-14 RX ORDER — METOPROLOL SUCCINATE 50 MG/1
TABLET, EXTENDED RELEASE ORAL
Qty: 90 TABLET | Refills: 0 | Status: SHIPPED | OUTPATIENT
Start: 2021-06-14 | End: 2021-09-17

## 2021-06-14 NOTE — TELEPHONE ENCOUNTER
METOPROLOL SUCC ER 50 MG TAB     Sig: TAKE 1 TABLET BY MOUTH EVERY DAY    Disp:  90 tablet    Refills:  0 (Pharmacy requested: Not specified)    Start: 6/6/2021    Class: Normal    Non-formulary For: Essential hypertension, benign    Last ordered: 3 months

## 2021-06-14 NOTE — TELEPHONE ENCOUNTER
LOV 09/21/2020 w/ Olga Melgar for medicare annual  Upcoming visit w/Lluvia on 09/21/2021  Last CMP 09/20/20 - glucose high  NOTED  Benign essential HTN  BP controlled  High normal - should improve with weight loss.     REQUESTED f/u APPT 3/21/21  WILL give one r

## 2021-06-15 RX ORDER — METOPROLOL SUCCINATE 50 MG/1
50 TABLET, EXTENDED RELEASE ORAL DAILY
Qty: 90 TABLET | Refills: 0 | OUTPATIENT
Start: 2021-06-15

## 2021-06-30 ENCOUNTER — PATIENT MESSAGE (OUTPATIENT)
Dept: FAMILY MEDICINE CLINIC | Facility: CLINIC | Age: 71
End: 2021-06-30

## 2021-06-30 NOTE — TELEPHONE ENCOUNTER
From: Pranav Ortez  To: Rivka Alcazar MD  Sent: 2021 10:25 AM CDT  Subject: Non-Urgent Medical Question    Dr. Kings Ramirez:    My current medical freeze status from [employer] is about to  on 2021.  I need to extend that freeze unt

## 2021-06-30 NOTE — TELEPHONE ENCOUNTER
Phone call to pt  Patient states that he needs to have his   [id number] membership to be put on hold through Oct 1,  2021 due to medical  knee issues.      Letter updated in mychart and pt aware

## 2021-08-12 DIAGNOSIS — I10 BENIGN ESSENTIAL HTN: ICD-10-CM

## 2021-08-13 RX ORDER — LISINOPRIL 40 MG/1
TABLET ORAL
Qty: 90 TABLET | Refills: 0 | Status: SHIPPED | OUTPATIENT
Start: 2021-08-13 | End: 2021-11-15

## 2021-08-25 ENCOUNTER — TELEPHONE (OUTPATIENT)
Dept: FAMILY MEDICINE CLINIC | Facility: CLINIC | Age: 71
End: 2021-08-25

## 2021-08-25 DIAGNOSIS — N40.1 BPH ASSOCIATED WITH NOCTURIA: ICD-10-CM

## 2021-08-25 DIAGNOSIS — R35.1 BPH ASSOCIATED WITH NOCTURIA: ICD-10-CM

## 2021-08-25 DIAGNOSIS — E11.49 TYPE II DIABETES MELLITUS WITH NEUROLOGICAL MANIFESTATIONS (HCC): ICD-10-CM

## 2021-08-25 DIAGNOSIS — I10 BENIGN ESSENTIAL HTN: Primary | ICD-10-CM

## 2021-08-25 DIAGNOSIS — Z13.6 SCREENING FOR CARDIOVASCULAR CONDITION: ICD-10-CM

## 2021-08-25 DIAGNOSIS — R97.20 ELEVATED PSA, LESS THAN 10 NG/ML: ICD-10-CM

## 2021-08-25 DIAGNOSIS — Z12.5 SCREENING FOR MALIGNANT NEOPLASM OF PROSTATE: ICD-10-CM

## 2021-08-25 NOTE — TELEPHONE ENCOUNTER
Please enter lab orders for the patient's upcoming physical appointment. Physical scheduled:    Your appointments     Date & Time Appointment Department [facility])    Sep 21, 2021 10:30 AM CDT Medicare Annual Well Visit with MD Pedro Gunderson

## 2021-09-08 PROCEDURE — 3060F POS MICROALBUMINURIA REV: CPT | Performed by: FAMILY MEDICINE

## 2021-09-08 PROCEDURE — 3061F NEG MICROALBUMINURIA REV: CPT | Performed by: FAMILY MEDICINE

## 2021-09-08 PROCEDURE — 3044F HG A1C LEVEL LT 7.0%: CPT | Performed by: FAMILY MEDICINE

## 2021-09-09 LAB
% FREE PSA: 24 % (CALC)
ALBUMIN/GLOBULIN RATIO: 1.7 (CALC) (ref 1–2.5)
ALBUMIN: 4.1 G/DL (ref 3.6–5.1)
ALKALINE PHOSPHATASE: 55 U/L (ref 35–144)
ALT: 27 U/L (ref 9–46)
AST: 22 U/L (ref 10–35)
BILIRUBIN, TOTAL: 0.5 MG/DL (ref 0.2–1.2)
BUN: 18 MG/DL (ref 7–25)
CALCIUM: 9.4 MG/DL (ref 8.6–10.3)
CARBON DIOXIDE: 26 MMOL/L (ref 20–32)
CHLORIDE: 106 MMOL/L (ref 98–110)
CHOL/HDLC RATIO: 3.8 (CALC)
CHOLESTEROL, TOTAL: 163 MG/DL
CREATININE, RANDOM URINE: 122 MG/DL (ref 20–320)
CREATININE: 1.06 MG/DL (ref 0.7–1.18)
EGFR IF AFRICN AM: 81 ML/MIN/1.73M2
EGFR IF NONAFRICN AM: 70 ML/MIN/1.73M2
FREE PSA: 1 NG/ML
GLOBULIN: 2.4 G/DL (CALC) (ref 1.9–3.7)
GLUCOSE: 133 MG/DL (ref 65–99)
HDL CHOLESTEROL: 43 MG/DL
HEMOGLOBIN A1C: 6.9 % OF TOTAL HGB
LDL-CHOLESTEROL: 95 MG/DL (CALC)
MICROALBUMIN/CREATININE RATIO, RANDOM URINE: 37 MCG/MG CREAT
MICROALBUMIN: 4.5 MG/DL
NON-HDL CHOLESTEROL: 120 MG/DL (CALC)
POTASSIUM: 4.2 MMOL/L (ref 3.5–5.3)
PROTEIN, TOTAL: 6.5 G/DL (ref 6.1–8.1)
SODIUM: 140 MMOL/L (ref 135–146)
TOTAL PSA: 4.2 NG/ML
TRIGLYCERIDES: 146 MG/DL

## 2021-09-15 ENCOUNTER — TELEPHONE (OUTPATIENT)
Dept: FAMILY MEDICINE CLINIC | Facility: CLINIC | Age: 71
End: 2021-09-15

## 2021-09-16 DIAGNOSIS — I10 ESSENTIAL HYPERTENSION, BENIGN: ICD-10-CM

## 2021-09-17 RX ORDER — METOPROLOL SUCCINATE 50 MG/1
TABLET, EXTENDED RELEASE ORAL
Qty: 90 TABLET | Refills: 3 | Status: SHIPPED | OUTPATIENT
Start: 2021-09-17

## 2021-09-17 NOTE — TELEPHONE ENCOUNTER
Requested Prescriptions     Pending Prescriptions Disp Refills   • METOPROLOL SUCCINATE 50 MG Oral Tablet 24 Hr [Pharmacy Med Name: METOPROLOL SUCC ER 50 MG TAB] 90 tablet 0     Sig: TAKE 1 TABLET BY MOUTH EVERY DAY     LOV 9/21/2020     Patient was asked

## 2021-09-21 ENCOUNTER — OFFICE VISIT (OUTPATIENT)
Dept: FAMILY MEDICINE CLINIC | Facility: CLINIC | Age: 71
End: 2021-09-21
Payer: MEDICARE

## 2021-09-21 VITALS
WEIGHT: 247.38 LBS | SYSTOLIC BLOOD PRESSURE: 128 MMHG | HEIGHT: 68.5 IN | DIASTOLIC BLOOD PRESSURE: 70 MMHG | TEMPERATURE: 98 F | RESPIRATION RATE: 16 BRPM | BODY MASS INDEX: 37.06 KG/M2 | HEART RATE: 63 BPM | OXYGEN SATURATION: 98 %

## 2021-09-21 DIAGNOSIS — E66.01 SEVERE OBESITY WITH BODY MASS INDEX (BMI) OF 35.0 TO 39.9 WITH COMORBIDITY (HCC): ICD-10-CM

## 2021-09-21 DIAGNOSIS — E11.29 MICROALBUMINURIA DUE TO TYPE 2 DIABETES MELLITUS (HCC): ICD-10-CM

## 2021-09-21 DIAGNOSIS — I10 BENIGN ESSENTIAL HTN: ICD-10-CM

## 2021-09-21 DIAGNOSIS — E11.49 TYPE II DIABETES MELLITUS WITH NEUROLOGICAL MANIFESTATIONS (HCC): ICD-10-CM

## 2021-09-21 DIAGNOSIS — Z00.00 ENCOUNTER FOR ANNUAL HEALTH EXAMINATION: Primary | ICD-10-CM

## 2021-09-21 DIAGNOSIS — G47.33 OSA ON CPAP: ICD-10-CM

## 2021-09-21 DIAGNOSIS — R80.9 MICROALBUMINURIA DUE TO TYPE 2 DIABETES MELLITUS (HCC): ICD-10-CM

## 2021-09-21 DIAGNOSIS — M47.816 ARTHRITIS OF LUMBAR SPINE: ICD-10-CM

## 2021-09-21 DIAGNOSIS — Z99.89 OSA ON CPAP: ICD-10-CM

## 2021-09-21 DIAGNOSIS — Z85.828 HISTORY OF BASAL CELL CARCINOMA OF EYELID: ICD-10-CM

## 2021-09-21 PROBLEM — L30.4 INTERTRIGO: Status: RESOLVED | Noted: 2020-10-06 | Resolved: 2021-09-21

## 2021-09-21 PROBLEM — L82.1 OTHER SEBORRHEIC KERATOSIS: Status: RESOLVED | Noted: 2020-10-06 | Resolved: 2021-09-21

## 2021-09-21 PROCEDURE — 3074F SYST BP LT 130 MM HG: CPT | Performed by: FAMILY MEDICINE

## 2021-09-21 PROCEDURE — G0439 PPPS, SUBSEQ VISIT: HCPCS | Performed by: FAMILY MEDICINE

## 2021-09-21 PROCEDURE — 99397 PER PM REEVAL EST PAT 65+ YR: CPT | Performed by: FAMILY MEDICINE

## 2021-09-21 PROCEDURE — 3078F DIAST BP <80 MM HG: CPT | Performed by: FAMILY MEDICINE

## 2021-09-21 PROCEDURE — 3008F BODY MASS INDEX DOCD: CPT | Performed by: FAMILY MEDICINE

## 2021-09-21 PROCEDURE — 96160 PT-FOCUSED HLTH RISK ASSMT: CPT | Performed by: FAMILY MEDICINE

## 2021-09-21 NOTE — PATIENT INSTRUCTIONS
Rina Rasheed's SCREENING SCHEDULE   Tests on this list are recommended by your physician but may not be covered, or covered at this frequency, by your insurer. Please check with your insurance carrier before scheduling to verify coverage.    PREVENTAT [id number]) covered once after 65 Prevnar 13: 09/10/2019    [id number]: 09/21/2020     No recommendations at this time    Hepatitis B One screening covered for patients with certain risk factors   -  No recommendations at this time    [employer]ox[employer] Not has the State forms available on it's website for anyone to review and print using their home computer and printer. (the forms are also available in [place]  www. putitinwriting. org  This link also has information from the [address]

## 2021-09-21 NOTE — PROGRESS NOTES
HPI:   Corazon Rodriguez is a 69year old male who presents for a MA (Medicare Advantage) Supervisit (Once per calendar year). Preventative Screening  Colonoscopy - 6/9/2021. Repeat 5 yrs - 2026. Prostate - 4.2. Similar to a year ago - 4.3.    Génesis Salamanca forms available to patient in AVS       He smoked tobacco in the past but quit greater than 12 months ago.   Social History    Tobacco Use      Smoking status: Former Smoker        Packs/day: 0.50        Years: 5.00        Pack years: 2.5        Quit date: (Patient taking differently: Takes 1/2 tablet daily)  Multiple Vitamin (TAB-A-CHUCHO) Oral Tab, Take 1 tablet by mouth daily. aspirin 81 MG Oral Tab EC, Take 81 mg by mouth daily.        MEDICAL INFORMATION:   He  has a past medical history of Basal cell car BMI 37.07 kg/m²   Estimated body mass index is 37.07 kg/m² as calculated from the following:    Height as of this encounter: 5' 8.5\" (1.74 m). Weight as of this encounter: 247 lb 6.4 oz (112.2 kg).     Medicare Hearing Assessment  (Required for AWV/SWV OTHER RELEVANT CHRONIC CONDITIONS:   Danny Blankenship is a 69year old male who presents for a Medicare Assessment. PLAN SUMMARY:     Danny Blankenship was seen in the office today:  had concerns including Physical (MAW).     1. Encounter for annual health e poor?: No  How does the patient maintain a good energy level?: Appropriate Exercise  How would you describe your daily physical activity?: Light  How would you describe your current health state?: Good  How do you maintain positive mental well-being?: Soci years -    Fecal Occult Blood Test Covered annually -   Prostate Cancer Screening    Prostate-Specific Antigen (PSA) Annually No results found for: PSA  PSA due on 03/01/2020   Immunizations    Influenza Covered once per flu season  Please get every year 1

## 2021-11-14 DIAGNOSIS — I10 BENIGN ESSENTIAL HTN: ICD-10-CM

## 2021-11-15 RX ORDER — LISINOPRIL 40 MG/1
TABLET ORAL
Qty: 90 TABLET | Refills: 0 | Status: SHIPPED | OUTPATIENT
Start: 2021-11-15

## 2021-11-15 NOTE — TELEPHONE ENCOUNTER
Requested Prescriptions     Pending Prescriptions Disp Refills   • LISINOPRIL 40 MG Oral Tab [Pharmacy Med Name: LISINOPRIL 40 MG TABLET] 90 tablet 0     Sig: TAKE 1 TABLET BY MOUTH EVERY DAY     LOV 9/21/2021     Patient was asked to follow-up in: 1 year

## 2022-02-15 RX ORDER — LISINOPRIL 40 MG/1
TABLET ORAL
Qty: 90 TABLET | Refills: 0 | Status: SHIPPED | OUTPATIENT
Start: 2022-02-15

## 2022-02-15 RX ORDER — LISINOPRIL 40 MG/1
40 TABLET ORAL DAILY
Qty: 90 TABLET | Refills: 0 | Status: SHIPPED | OUTPATIENT
Start: 2022-02-15

## 2022-03-16 ENCOUNTER — TELEPHONE (OUTPATIENT)
Dept: FAMILY MEDICINE CLINIC | Facility: CLINIC | Age: 72
End: 2022-03-16

## 2022-03-16 NOTE — TELEPHONE ENCOUNTER
TC to patient we received request from [address] [place] [address] for a new machine and supplies   Per patient he received a new machine last year through [employer] and they are handling his refills on supplies as well. We will disregard this request we received from [address] [place] [address]. none

## 2022-04-19 ENCOUNTER — TELEPHONE (OUTPATIENT)
Dept: ORTHOPEDICS CLINIC | Facility: CLINIC | Age: 72
End: 2022-04-19

## 2022-04-19 NOTE — TELEPHONE ENCOUNTER
Order placed for left foot XR   Scheduled XR appt. Attempted to reach patient, no answer, left voicemail to arrive 30 mins prior to patients appt to complete XR order. Office information left for patient to call back with questions. [employer] message sent to patient with XR appointment instructions.   Future Appointments   Date Time Provider Funmi Lomeli   5/6/2022 11:00 AM WDR XR RM2 WDR XRAY [facility]   5/6/2022 11:15 AM Sharad Bartholomew, DPM [facility] [id number]   9/21/2022 11:00 AM Camille Hicks MD [facility] 3 [facility]   10/5/2022 10:00 AM Alex Bailey MD [facility]ER[facility]

## 2022-05-01 ENCOUNTER — PATIENT MESSAGE (OUTPATIENT)
Dept: FAMILY MEDICINE CLINIC | Facility: CLINIC | Age: 72
End: 2022-05-01

## 2022-05-02 NOTE — TELEPHONE ENCOUNTER
From: Luis Zimmerman  To: Henrik Hubbard MD  Sent: 5/1/2022 9:02 PM CDT  Subject: Positive Test for Covid    Dr. Conklin Primes:  Unfortunately, it looks like I caught Covid . I tested positive for it today. Symptoms include running nose, plugged up ears, and coughing. Temperature right now is 100.9 F. Wondering how I should treat this. I'm drinking a lot fluids and will take tylenol to control fever. I understand there is a new anti virual named Paxlovid. Would this be a prescription that would help me since I'm higher risk. Please let me know.    Thanks,  Homar Storey

## 2022-05-16 RX ORDER — LISINOPRIL 40 MG/1
40 TABLET ORAL DAILY
Qty: 90 TABLET | Refills: 0 | Status: SHIPPED | OUTPATIENT
Start: 2022-05-16

## 2022-05-27 ENCOUNTER — OFFICE VISIT (OUTPATIENT)
Dept: ORTHOPEDICS CLINIC | Facility: CLINIC | Age: 72
End: 2022-05-27
Payer: MEDICARE

## 2022-05-27 ENCOUNTER — HOSPITAL ENCOUNTER (OUTPATIENT)
Dept: GENERAL RADIOLOGY | Age: 72
Discharge: HOME | End: 2022-05-27
Attending: PODIATRIST
Payer: MEDICARE

## 2022-05-27 DIAGNOSIS — L85.9 HYPERKERATOSIS: Primary | ICD-10-CM

## 2022-05-27 DIAGNOSIS — E11.49 TYPE II DIABETES MELLITUS WITH NEUROLOGICAL MANIFESTATIONS (HCC): ICD-10-CM

## 2022-05-27 DIAGNOSIS — M79.672 PAIN OF LEFT HEEL: ICD-10-CM

## 2022-05-27 DIAGNOSIS — M21.6X2 PLANTAR FAT PAD ATROPHY OF LEFT FOOT: ICD-10-CM

## 2022-05-27 DIAGNOSIS — M79.672 LEFT FOOT PAIN: ICD-10-CM

## 2022-05-27 DIAGNOSIS — Z96.9 PRESENCE OF RETAINED HARDWARE: ICD-10-CM

## 2022-05-27 PROCEDURE — 73630 X-RAY EXAM OF FOOT: CPT | Performed by: PODIATRIST

## 2022-05-27 PROCEDURE — 99203 OFFICE O/P NEW LOW 30 MIN: CPT | Performed by: PODIATRIST

## 2022-05-27 RX ORDER — NAFTIFINE HYDROCHLORIDE 2 G/100G
1 GEL TOPICAL DAILY
Qty: 60 G | Refills: 0 | Status: SHIPPED | OUTPATIENT
Start: 2022-05-27

## 2022-05-27 RX ORDER — NAFTIFINE HYDROCHLORIDE 2 G/100G
1 GEL TOPICAL DAILY
Qty: 60 G | Refills: 0 | COMMUNITY
Start: 2022-05-27

## 2022-06-17 ENCOUNTER — OFFICE VISIT (OUTPATIENT)
Dept: ORTHOPEDICS CLINIC | Facility: CLINIC | Age: 72
End: 2022-06-17
Payer: MEDICARE

## 2022-06-17 ENCOUNTER — TELEPHONE (OUTPATIENT)
Dept: FAMILY MEDICINE CLINIC | Facility: CLINIC | Age: 72
End: 2022-06-17

## 2022-06-17 VITALS — WEIGHT: 240 LBS | HEIGHT: 69 IN | BODY MASS INDEX: 35.55 KG/M2

## 2022-06-17 DIAGNOSIS — Z96.9 PRESENCE OF RETAINED HARDWARE: Primary | ICD-10-CM

## 2022-06-17 DIAGNOSIS — E11.49 TYPE II DIABETES MELLITUS WITH NEUROLOGICAL MANIFESTATIONS (HCC): ICD-10-CM

## 2022-06-17 DIAGNOSIS — L85.9 HYPERKERATOSIS: ICD-10-CM

## 2022-06-17 DIAGNOSIS — B35.1 ONYCHOMYCOSIS: ICD-10-CM

## 2022-06-17 PROBLEM — E66.01 MORBID (SEVERE) OBESITY DUE TO EXCESS CALORIES (HCC): Status: ACTIVE | Noted: 2022-06-17

## 2022-06-17 PROCEDURE — 3008F BODY MASS INDEX DOCD: CPT | Performed by: PODIATRIST

## 2022-06-17 PROCEDURE — 99213 OFFICE O/P EST LOW 20 MIN: CPT | Performed by: PODIATRIST

## 2022-06-17 PROCEDURE — 1125F AMNT PAIN NOTED PAIN PRSNT: CPT | Performed by: PODIATRIST

## 2022-06-17 NOTE — TELEPHONE ENCOUNTER
Received fax from [address] [place] [address] for new CPAP machine. I called patient to let him know Dr. Olivia Mak does not order new machines, this is done by pulmonology office as they know the correct settings. Patient states he just got a new machine from [address] [place] [address].   He received one a year ago but it wasn't working properly so he just got a new one but he will defer any orders to his pulmonologist.

## 2022-06-17 NOTE — PROGRESS NOTES
[facility] Progress Note    Subjective: Shani Venegas is here for follow-up of his plantar callus, plantar heel area from old fracture years ago of the calcaneus and resultant proud bone that has developed on the bottom of the foot. He still is having pain but he does feel better with the debridement last visit. We also had discussed going ahead with orthotics. Objective:     Exam plantar arch proximal heel, proximal palpable bone with overlying bursitis and H PK. Left foot. No signs of infection no swelling no warmth. Assessment/Plan:     Diagnoses and all orders for this visit:    Presence of retained hardware  -     DME - EXTERNAL     Hyperkeratosis  -     DME - EXTERNAL     Type II diabetes mellitus with neurological manifestations ([facility] [address]  -     DME - EXTERNAL     Onychomycosis        He did well with the debridement and I trimmed the callus a little bit more today. The plan is to send him to [facility] for orthotics with a cut out in that area. Long-term we may consider excision of the plantar prominent bone and I briefly discussed what would be involved with that procedure today. Follow-up when he is ready for that to be filed down again and also after getting the orthotics so I can see how they are working for him            Ruperto Rich. Italia Castañeda, DPM  [facility]    [employer] speech recognition software was used to prepare this note. If a word or phrase is confusing, it is likely do to a failure of recognition. Please contact me with any questions or clarifications.

## 2022-07-20 ENCOUNTER — HOSPITAL ENCOUNTER (OUTPATIENT)
Age: 72
Discharge: HOME | End: 2022-07-20
Payer: MEDICARE

## 2022-07-20 ENCOUNTER — APPOINTMENT (OUTPATIENT)
Dept: GENERAL RADIOLOGY | Age: 72
End: 2022-07-20
Attending: NURSE PRACTITIONER
Payer: MEDICARE

## 2022-07-20 VITALS
RESPIRATION RATE: 18 BRPM | WEIGHT: 240 LBS | DIASTOLIC BLOOD PRESSURE: 65 MMHG | SYSTOLIC BLOOD PRESSURE: 131 MMHG | BODY MASS INDEX: 36.37 KG/M2 | TEMPERATURE: 100 F | OXYGEN SATURATION: 95 % | HEIGHT: 68 IN | HEART RATE: 67 BPM

## 2022-07-20 DIAGNOSIS — M54.42 ACUTE LEFT-SIDED LOW BACK PAIN WITH LEFT-SIDED SCIATICA: Primary | ICD-10-CM

## 2022-07-20 PROCEDURE — 72110 X-RAY EXAM L-2 SPINE 4/>VWS: CPT | Performed by: NURSE PRACTITIONER

## 2022-07-20 PROCEDURE — 99214 OFFICE O/P EST MOD 30 MIN: CPT | Performed by: NURSE PRACTITIONER

## 2022-07-20 RX ORDER — METHYLPREDNISOLONE 4 MG/1
TABLET ORAL
Qty: 21 TABLET | Refills: 0 | Status: SHIPPED | OUTPATIENT
Start: 2022-07-20

## 2022-07-20 NOTE — ED INITIAL ASSESSMENT (HPI)
Low to left side back pain x injury 3 weeks after moving luggage, worse w standing & walking. Temporary relief w aleve BID. Denies numbness, tingling, denies loss of bowel or bladder.

## 2022-08-14 DIAGNOSIS — I10 BENIGN ESSENTIAL HTN: ICD-10-CM

## 2022-08-16 RX ORDER — LISINOPRIL 40 MG/1
TABLET ORAL
Qty: 90 TABLET | Refills: 0 | Status: SHIPPED | OUTPATIENT
Start: 2022-08-16

## 2022-09-02 ENCOUNTER — TELEPHONE (OUTPATIENT)
Dept: FAMILY MEDICINE CLINIC | Facility: CLINIC | Age: 72
End: 2022-09-02

## 2022-09-02 DIAGNOSIS — I10 BENIGN ESSENTIAL HTN: ICD-10-CM

## 2022-09-02 DIAGNOSIS — R80.9 MICROALBUMINURIA DUE TO TYPE 2 DIABETES MELLITUS (HCC): ICD-10-CM

## 2022-09-02 DIAGNOSIS — R97.20 ELEVATED PSA, LESS THAN 10 NG/ML: ICD-10-CM

## 2022-09-02 DIAGNOSIS — E11.29 MICROALBUMINURIA DUE TO TYPE 2 DIABETES MELLITUS (HCC): ICD-10-CM

## 2022-09-02 DIAGNOSIS — E11.49 TYPE II DIABETES MELLITUS WITH NEUROLOGICAL MANIFESTATIONS (HCC): Primary | ICD-10-CM

## 2022-09-02 DIAGNOSIS — R35.1 BPH ASSOCIATED WITH NOCTURIA: ICD-10-CM

## 2022-09-02 DIAGNOSIS — Z13.6 SCREENING FOR CARDIOVASCULAR CONDITION: ICD-10-CM

## 2022-09-02 DIAGNOSIS — N40.1 BPH ASSOCIATED WITH NOCTURIA: ICD-10-CM

## 2022-09-02 NOTE — TELEPHONE ENCOUNTER
Please enter lab orders for the patient's upcoming physical appointment. Physical scheduled: Your appointments     Date & Time Appointment Department [facility])    Sep 21, 2022 11:00 AM CDT Medicare Annual Well Visit with Claudio Duron MD [facility], [address], [place]  (Meredith Arciniega)            Yessenia Vieira [address] [facility] [phone]         Preferred lab: [facility]     The patient has been notified to complete fasting labs prior to their physical appointment.

## 2022-09-13 PROCEDURE — 3044F HG A1C LEVEL LT 7.0%: CPT | Performed by: FAMILY MEDICINE

## 2022-09-13 PROCEDURE — 3061F NEG MICROALBUMINURIA REV: CPT | Performed by: FAMILY MEDICINE

## 2022-09-14 LAB
% FREE PSA: 30 % (CALC)
ALBUMIN/GLOBULIN RATIO: 1.6 (CALC) (ref 1–2.5)
ALBUMIN: 4.1 G/DL (ref 3.6–5.1)
ALKALINE PHOSPHATASE: 48 U/L (ref 35–144)
ALT: 22 U/L (ref 9–46)
AST: 20 U/L (ref 10–35)
BILIRUBIN, TOTAL: 0.7 MG/DL (ref 0.2–1.2)
BUN/CREATININE RATIO: 22 (CALC) (ref 6–22)
BUN: 27 MG/DL (ref 7–25)
CALCIUM: 9.8 MG/DL (ref 8.6–10.3)
CARBON DIOXIDE: 27 MMOL/L (ref 20–32)
CHLORIDE: 105 MMOL/L (ref 98–110)
CHOL/HDLC RATIO: 3.7 (CALC)
CHOLESTEROL, TOTAL: 157 MG/DL
CREATININE, RANDOM URINE: 158 MG/DL (ref 20–320)
CREATININE: 1.23 MG/DL (ref 0.7–1.28)
EGFR: 62 ML/MIN/1.73M2
FREE PSA: 1.5 NG/ML
GLOBULIN: 2.5 G/DL (CALC) (ref 1.9–3.7)
GLUCOSE: 130 MG/DL (ref 65–99)
HDL CHOLESTEROL: 43 MG/DL
HEMOGLOBIN A1C: 6.6 % OF TOTAL HGB
LDL-CHOLESTEROL: 92 MG/DL (CALC)
MICROALBUMIN/CREATININE RATIO, RANDOM URINE: 16 MCG/MG CREAT
MICROALBUMIN: 2.5 MG/DL
NON-HDL CHOLESTEROL: 114 MG/DL (CALC)
POTASSIUM: 3.8 MMOL/L (ref 3.5–5.3)
PROTEIN, TOTAL: 6.6 G/DL (ref 6.1–8.1)
SODIUM: 141 MMOL/L (ref 135–146)
TOTAL PSA: 5 NG/ML
TRIGLYCERIDES: 128 MG/DL

## 2022-09-15 ENCOUNTER — TELEPHONE (OUTPATIENT)
Dept: FAMILY MEDICINE CLINIC | Facility: CLINIC | Age: 72
End: 2022-09-15

## 2022-09-15 DIAGNOSIS — I10 ESSENTIAL HYPERTENSION, BENIGN: ICD-10-CM

## 2022-09-15 RX ORDER — METOPROLOL SUCCINATE 50 MG/1
TABLET, EXTENDED RELEASE ORAL
Qty: 90 TABLET | Refills: 1 | Status: SHIPPED | OUTPATIENT
Start: 2022-09-15

## 2022-09-21 ENCOUNTER — OFFICE VISIT (OUTPATIENT)
Dept: FAMILY MEDICINE CLINIC | Facility: CLINIC | Age: 72
End: 2022-09-21

## 2022-09-21 VITALS
TEMPERATURE: 99 F | WEIGHT: 244.63 LBS | BODY MASS INDEX: 37.07 KG/M2 | OXYGEN SATURATION: 98 % | DIASTOLIC BLOOD PRESSURE: 70 MMHG | SYSTOLIC BLOOD PRESSURE: 130 MMHG | HEART RATE: 75 BPM | RESPIRATION RATE: 16 BRPM | HEIGHT: 68 IN

## 2022-09-21 DIAGNOSIS — G47.33 OSA ON CPAP: ICD-10-CM

## 2022-09-21 DIAGNOSIS — I10 BENIGN ESSENTIAL HTN: ICD-10-CM

## 2022-09-21 DIAGNOSIS — Z00.00 ENCOUNTER FOR ANNUAL HEALTH EXAMINATION: Primary | ICD-10-CM

## 2022-09-21 DIAGNOSIS — Z99.89 OSA ON CPAP: ICD-10-CM

## 2022-09-21 DIAGNOSIS — E11.49 TYPE II DIABETES MELLITUS WITH NEUROLOGICAL MANIFESTATIONS (HCC): ICD-10-CM

## 2022-09-21 DIAGNOSIS — M47.816 ARTHRITIS OF LUMBAR SPINE: ICD-10-CM

## 2022-09-21 DIAGNOSIS — R80.9 MICROALBUMINURIA DUE TO TYPE 2 DIABETES MELLITUS (HCC): ICD-10-CM

## 2022-09-21 DIAGNOSIS — Z85.828 HISTORY OF BASAL CELL CARCINOMA OF EYELID: ICD-10-CM

## 2022-09-21 DIAGNOSIS — E66.01 SEVERE OBESITY WITH BODY MASS INDEX (BMI) OF 35.0 TO 39.9 WITH COMORBIDITY (HCC): ICD-10-CM

## 2022-09-21 DIAGNOSIS — E11.29 MICROALBUMINURIA DUE TO TYPE 2 DIABETES MELLITUS (HCC): ICD-10-CM

## 2022-09-21 PROBLEM — M79.672 PAIN OF LEFT HEEL: Status: RESOLVED | Noted: 2022-05-27 | Resolved: 2022-09-21

## 2022-09-21 PROBLEM — Z96.9 PRESENCE OF RETAINED HARDWARE: Status: RESOLVED | Noted: 2022-05-27 | Resolved: 2022-09-21

## 2022-09-21 PROBLEM — L85.9 HYPERKERATOSIS: Status: RESOLVED | Noted: 2022-05-27 | Resolved: 2022-09-21

## 2022-09-21 PROCEDURE — G0439 PPPS, SUBSEQ VISIT: HCPCS | Performed by: FAMILY MEDICINE

## 2022-09-21 PROCEDURE — 3075F SYST BP GE 130 - 139MM HG: CPT | Performed by: FAMILY MEDICINE

## 2022-09-21 PROCEDURE — 96160 PT-FOCUSED HLTH RISK ASSMT: CPT | Performed by: FAMILY MEDICINE

## 2022-09-21 PROCEDURE — 3078F DIAST BP <80 MM HG: CPT | Performed by: FAMILY MEDICINE

## 2022-09-21 PROCEDURE — 3008F BODY MASS INDEX DOCD: CPT | Performed by: FAMILY MEDICINE

## 2022-09-21 PROCEDURE — 1125F AMNT PAIN NOTED PAIN PRSNT: CPT | Performed by: FAMILY MEDICINE

## 2022-09-21 PROCEDURE — 99397 PER PM REEVAL EST PAT 65+ YR: CPT | Performed by: FAMILY MEDICINE

## 2022-10-20 DIAGNOSIS — I10 ESSENTIAL HYPERTENSION, BENIGN: ICD-10-CM

## 2022-10-21 RX ORDER — TRIAMTERENE AND HYDROCHLOROTHIAZIDE 75; 50 MG/1; MG/1
1 TABLET ORAL DAILY
Qty: 90 TABLET | Refills: 0 | Status: SHIPPED | OUTPATIENT
Start: 2022-10-21

## 2022-10-25 ENCOUNTER — TELEPHONE (OUTPATIENT)
Dept: FAMILY MEDICINE CLINIC | Facility: CLINIC | Age: 72
End: 2022-10-25

## 2022-10-28 PROBLEM — H33.321 RETINAL HOLE OF RIGHT EYE: Status: ACTIVE | Noted: 2022-10-28

## 2022-11-14 DIAGNOSIS — I10 BENIGN ESSENTIAL HTN: ICD-10-CM

## 2022-11-14 RX ORDER — LISINOPRIL 40 MG/1
TABLET ORAL
Qty: 90 TABLET | Refills: 0 | Status: SHIPPED | OUTPATIENT
Start: 2022-11-14

## 2023-01-19 DIAGNOSIS — I10 ESSENTIAL HYPERTENSION, BENIGN: ICD-10-CM

## 2023-01-19 RX ORDER — TRIAMTERENE AND HYDROCHLOROTHIAZIDE 75; 50 MG/1; MG/1
TABLET ORAL
Qty: 90 TABLET | Refills: 0 | Status: SHIPPED | OUTPATIENT
Start: 2023-01-19

## 2023-02-08 DIAGNOSIS — I10 BENIGN ESSENTIAL HTN: ICD-10-CM

## 2023-02-08 RX ORDER — LISINOPRIL 40 MG/1
TABLET ORAL
Qty: 90 TABLET | Refills: 0 | Status: SHIPPED | OUTPATIENT
Start: 2023-02-08

## 2023-03-12 DIAGNOSIS — I10 ESSENTIAL HYPERTENSION, BENIGN: ICD-10-CM

## 2023-03-14 RX ORDER — METOPROLOL SUCCINATE 50 MG/1
TABLET, EXTENDED RELEASE ORAL
Qty: 90 TABLET | Refills: 1 | Status: SHIPPED | OUTPATIENT
Start: 2023-03-14

## 2023-04-19 ENCOUNTER — OFFICE VISIT (OUTPATIENT)
Facility: CLINIC | Age: 73
End: 2023-04-19
Payer: MEDICARE

## 2023-04-19 VITALS
SYSTOLIC BLOOD PRESSURE: 134 MMHG | BODY MASS INDEX: 38.19 KG/M2 | WEIGHT: 252 LBS | RESPIRATION RATE: 18 BRPM | DIASTOLIC BLOOD PRESSURE: 82 MMHG | HEART RATE: 64 BPM | OXYGEN SATURATION: 96 % | HEIGHT: 68 IN

## 2023-04-19 DIAGNOSIS — G47.33 OSA ON CPAP: Primary | ICD-10-CM

## 2023-04-19 DIAGNOSIS — I10 BENIGN ESSENTIAL HTN: ICD-10-CM

## 2023-04-19 DIAGNOSIS — Z99.89 OSA ON CPAP: Primary | ICD-10-CM

## 2023-04-19 DIAGNOSIS — E66.01 SEVERE OBESITY WITH BODY MASS INDEX (BMI) OF 35.0 TO 39.9 WITH COMORBIDITY (HCC): ICD-10-CM

## 2023-04-19 DIAGNOSIS — E11.49 TYPE II DIABETES MELLITUS WITH NEUROLOGICAL MANIFESTATIONS (HCC): ICD-10-CM

## 2023-04-19 PROCEDURE — 3079F DIAST BP 80-89 MM HG: CPT | Performed by: OTHER

## 2023-04-19 PROCEDURE — 99214 OFFICE O/P EST MOD 30 MIN: CPT | Performed by: OTHER

## 2023-04-19 PROCEDURE — 3008F BODY MASS INDEX DOCD: CPT | Performed by: OTHER

## 2023-04-19 PROCEDURE — 3075F SYST BP GE 130 - 139MM HG: CPT | Performed by: OTHER

## 2023-05-07 DIAGNOSIS — I10 BENIGN ESSENTIAL HTN: ICD-10-CM

## 2023-05-10 RX ORDER — LISINOPRIL 40 MG/1
TABLET ORAL
Qty: 90 TABLET | Refills: 0 | Status: SHIPPED | OUTPATIENT
Start: 2023-05-10

## 2023-06-09 ENCOUNTER — TELEPHONE (OUTPATIENT)
Dept: FAMILY MEDICINE CLINIC | Facility: CLINIC | Age: 73
End: 2023-06-09

## 2023-06-09 DIAGNOSIS — E11.29 MICROALBUMINURIA DUE TO TYPE 2 DIABETES MELLITUS (HCC): ICD-10-CM

## 2023-06-09 DIAGNOSIS — E11.49 TYPE II DIABETES MELLITUS WITH NEUROLOGICAL MANIFESTATIONS (HCC): Primary | ICD-10-CM

## 2023-06-09 DIAGNOSIS — R80.9 MICROALBUMINURIA DUE TO TYPE 2 DIABETES MELLITUS (HCC): ICD-10-CM

## 2023-06-09 DIAGNOSIS — Z12.5 SCREENING FOR MALIGNANT NEOPLASM OF PROSTATE: ICD-10-CM

## 2023-06-09 DIAGNOSIS — N40.1 BPH ASSOCIATED WITH NOCTURIA: ICD-10-CM

## 2023-06-09 DIAGNOSIS — Z13.6 SCREENING FOR CARDIOVASCULAR CONDITION: ICD-10-CM

## 2023-06-09 DIAGNOSIS — R97.20 ELEVATED PSA, LESS THAN 10 NG/ML: ICD-10-CM

## 2023-06-09 DIAGNOSIS — R35.1 BPH ASSOCIATED WITH NOCTURIA: ICD-10-CM

## 2023-06-09 NOTE — TELEPHONE ENCOUNTER
Please enter lab orders for the patient's upcoming physical appointment. Physical scheduled: Your appointments     Date & Time Appointment Department [facility])    Sep 22, 2023 11:00 AM CDT Medicare Annual Well Visit with Kennedi Garcia MD [address], [address], [place] ([facility] Nurse)            Yuan Ga [address] [facility] [phone]         Preferred lab: [facility]     The patient has been notified to complete fasting labs prior to their physical appointment.

## 2023-06-29 ENCOUNTER — HOSPITAL ENCOUNTER (OUTPATIENT)
Age: 73
Discharge: HOME | End: 2023-06-29
Payer: MEDICARE

## 2023-06-29 VITALS
RESPIRATION RATE: 20 BRPM | HEART RATE: 78 BPM | TEMPERATURE: 98 F | DIASTOLIC BLOOD PRESSURE: 73 MMHG | BODY MASS INDEX: 35.55 KG/M2 | OXYGEN SATURATION: 95 % | HEIGHT: 69 IN | SYSTOLIC BLOOD PRESSURE: 143 MMHG | WEIGHT: 240 LBS

## 2023-06-29 DIAGNOSIS — H18.891 CORNEAL IRRITATION OF RIGHT EYE: Primary | ICD-10-CM

## 2023-06-29 PROCEDURE — 99213 OFFICE O/P EST LOW 20 MIN: CPT | Performed by: NURSE PRACTITIONER

## 2023-06-29 RX ORDER — TETRACAINE HYDROCHLORIDE 5 MG/ML
1 SOLUTION OPHTHALMIC ONCE
Status: COMPLETED | OUTPATIENT
Start: 2023-06-29 | End: 2023-06-29

## 2023-06-29 RX ORDER — TOBRAMYCIN 3 MG/ML
2 SOLUTION/ DROPS OPHTHALMIC EVERY 6 HOURS
Qty: 5 ML | Refills: 0 | Status: SHIPPED | OUTPATIENT
Start: 2023-06-29 | End: 2023-07-06

## 2023-08-05 DIAGNOSIS — I10 BENIGN ESSENTIAL HTN: ICD-10-CM

## 2023-08-05 RX ORDER — LISINOPRIL 40 MG/1
TABLET ORAL
Qty: 90 TABLET | Refills: 0 | Status: SHIPPED | OUTPATIENT
Start: 2023-08-05

## 2023-08-05 NOTE — TELEPHONE ENCOUNTER
Requested Prescriptions     Pending Prescriptions Disp Refills    LISINOPRIL 40 MG Oral Tab [Pharmacy Med Name: LISINOPRIL 40 MG TABLET] 90 tablet 0     Sig: TAKE 1 TABLET BY MOUTH EVERY DAY     LOV 9/21/2022     Patient was asked to follow-up in: 1 year    Appointment scheduled: 9/22/2023 Kameron Martinez MD     Medication refilled per protocol.

## 2023-09-08 PROCEDURE — 3061F NEG MICROALBUMINURIA REV: CPT | Performed by: FAMILY MEDICINE

## 2023-09-08 PROCEDURE — 3060F POS MICROALBUMINURIA REV: CPT | Performed by: FAMILY MEDICINE

## 2023-09-08 PROCEDURE — 3044F HG A1C LEVEL LT 7.0%: CPT | Performed by: FAMILY MEDICINE

## 2023-09-09 DIAGNOSIS — I10 ESSENTIAL HYPERTENSION, BENIGN: ICD-10-CM

## 2023-09-11 LAB
% FREE PSA: 22 % (CALC)
ALBUMIN/GLOBULIN RATIO: 1.5 (CALC) (ref 1–2.5)
ALBUMIN: 4.1 G/DL (ref 3.6–5.1)
ALKALINE PHOSPHATASE: 57 U/L (ref 35–144)
ALT: 26 U/L (ref 9–46)
AST: 22 U/L (ref 10–35)
BILIRUBIN, TOTAL: 0.5 MG/DL (ref 0.2–1.2)
BUN/CREATININE RATIO: 22 (CALC) (ref 6–22)
BUN: 26 MG/DL (ref 7–25)
CALCIUM: 9.4 MG/DL (ref 8.6–10.3)
CARBON DIOXIDE: 20 MMOL/L (ref 20–32)
CHLORIDE: 106 MMOL/L (ref 98–110)
CHOL/HDLC RATIO: 3.8 (CALC)
CHOLESTEROL, TOTAL: 172 MG/DL
CREATININE, RANDOM URINE: 109 MG/DL (ref 20–320)
CREATININE: 1.17 MG/DL (ref 0.7–1.28)
EGFR: 66 ML/MIN/1.73M2
FREE PSA: 1.4 NG/ML
GLOBULIN: 2.7 G/DL (CALC) (ref 1.9–3.7)
GLUCOSE: 150 MG/DL (ref 65–99)
HDL CHOLESTEROL: 45 MG/DL
HEMOGLOBIN A1C: 6.8 % OF TOTAL HGB
LDL-CHOLESTEROL: 101 MG/DL (CALC)
MICROALBUMIN/CREATININE RATIO, RANDOM URINE: 52 MCG/MG CREAT
MICROALBUMIN: 5.7 MG/DL
NON-HDL CHOLESTEROL: 127 MG/DL (CALC)
POTASSIUM: 4.2 MMOL/L (ref 3.5–5.3)
PROTEIN, TOTAL: 6.8 G/DL (ref 6.1–8.1)
SODIUM: 142 MMOL/L (ref 135–146)
TOTAL PSA: 6.3 NG/ML
TRIGLYCERIDES: 165 MG/DL

## 2023-09-12 RX ORDER — METOPROLOL SUCCINATE 50 MG/1
TABLET, EXTENDED RELEASE ORAL
Qty: 90 TABLET | Refills: 1 | Status: SHIPPED | OUTPATIENT
Start: 2023-09-12

## 2023-09-22 ENCOUNTER — OFFICE VISIT (OUTPATIENT)
Dept: FAMILY MEDICINE CLINIC | Facility: CLINIC | Age: 73
End: 2023-09-22
Payer: MEDICARE

## 2023-09-22 VITALS
DIASTOLIC BLOOD PRESSURE: 70 MMHG | HEIGHT: 69 IN | OXYGEN SATURATION: 98 % | RESPIRATION RATE: 16 BRPM | WEIGHT: 250.38 LBS | BODY MASS INDEX: 37.08 KG/M2 | HEART RATE: 76 BPM | SYSTOLIC BLOOD PRESSURE: 130 MMHG

## 2023-09-22 DIAGNOSIS — Z85.828 HISTORY OF BASAL CELL CARCINOMA OF EYELID: ICD-10-CM

## 2023-09-22 DIAGNOSIS — M47.816 ARTHRITIS OF LUMBAR SPINE: ICD-10-CM

## 2023-09-22 DIAGNOSIS — E11.49 TYPE II DIABETES MELLITUS WITH NEUROLOGICAL MANIFESTATIONS (HCC): ICD-10-CM

## 2023-09-22 DIAGNOSIS — I10 BENIGN ESSENTIAL HTN: ICD-10-CM

## 2023-09-22 DIAGNOSIS — Z00.00 ENCOUNTER FOR ANNUAL HEALTH EXAMINATION: Primary | ICD-10-CM

## 2023-09-22 DIAGNOSIS — M17.11 ARTHRITIS OF RIGHT KNEE: ICD-10-CM

## 2023-09-22 DIAGNOSIS — H33.321 RETINAL HOLE OF RIGHT EYE: ICD-10-CM

## 2023-09-22 DIAGNOSIS — E11.29 MICROALBUMINURIA DUE TO TYPE 2 DIABETES MELLITUS: ICD-10-CM

## 2023-09-22 DIAGNOSIS — G47.33 OSA ON CPAP: ICD-10-CM

## 2023-09-22 DIAGNOSIS — R97.20 ELEVATED PSA, LESS THAN 10 NG/ML: ICD-10-CM

## 2023-09-22 DIAGNOSIS — E66.01 SEVERE OBESITY WITH BODY MASS INDEX (BMI) OF 35.0 TO 39.9 WITH COMORBIDITY (HCC): ICD-10-CM

## 2023-09-22 DIAGNOSIS — R80.9 MICROALBUMINURIA DUE TO TYPE 2 DIABETES MELLITUS: ICD-10-CM

## 2023-09-22 DIAGNOSIS — E78.5 HYPERLIPIDEMIA LDL GOAL <100: ICD-10-CM

## 2023-09-22 PROCEDURE — 3078F DIAST BP <80 MM HG: CPT | Performed by: FAMILY MEDICINE

## 2023-09-22 PROCEDURE — 1159F MED LIST DOCD IN RCRD: CPT | Performed by: FAMILY MEDICINE

## 2023-09-22 PROCEDURE — 1170F FXNL STATUS ASSESSED: CPT | Performed by: FAMILY MEDICINE

## 2023-09-22 PROCEDURE — 3075F SYST BP GE 130 - 139MM HG: CPT | Performed by: FAMILY MEDICINE

## 2023-09-22 PROCEDURE — G0439 PPPS, SUBSEQ VISIT: HCPCS | Performed by: FAMILY MEDICINE

## 2023-09-22 PROCEDURE — 1125F AMNT PAIN NOTED PAIN PRSNT: CPT | Performed by: FAMILY MEDICINE

## 2023-09-22 PROCEDURE — 1160F RVW MEDS BY RX/DR IN RCRD: CPT | Performed by: FAMILY MEDICINE

## 2023-09-22 PROCEDURE — 96160 PT-FOCUSED HLTH RISK ASSMT: CPT | Performed by: FAMILY MEDICINE

## 2023-09-22 PROCEDURE — 3008F BODY MASS INDEX DOCD: CPT | Performed by: FAMILY MEDICINE

## 2023-09-22 RX ORDER — ROSUVASTATIN CALCIUM 5 MG/1
5 TABLET, COATED ORAL NIGHTLY
Qty: 90 TABLET | Refills: 3 | Status: SHIPPED | OUTPATIENT
Start: 2023-09-22

## 2023-10-13 DIAGNOSIS — I10 ESSENTIAL HYPERTENSION, BENIGN: ICD-10-CM

## 2023-10-13 RX ORDER — TRIAMTERENE AND HYDROCHLOROTHIAZIDE 75; 50 MG/1; MG/1
1 TABLET ORAL DAILY
Qty: 90 TABLET | Refills: 0 | Status: SHIPPED | OUTPATIENT
Start: 2023-10-13

## 2023-10-13 NOTE — TELEPHONE ENCOUNTER
Requested Prescriptions     Pending Prescriptions Disp Refills    TRIAMTERENE-HYDROCHLOROTHIAZIDE 75-50 MG Oral Tab [Pharmacy Med Name: TRIAMTERENE-HCTZ 75-50 MG TAB] 90 tablet 1     Sig: TAKE 1 TABLET BY MOUTH EVERY DAY     LOV 9/22/2023     Patient was asked to follow-up in: Follow-up not documented in note    Appointment scheduled: 3/22/2024 Navneet Greenwood MD     Medication refilled per protocol.

## 2023-11-07 DIAGNOSIS — I10 BENIGN ESSENTIAL HTN: ICD-10-CM

## 2023-11-08 RX ORDER — LISINOPRIL 40 MG/1
TABLET ORAL
Qty: 90 TABLET | Refills: 1 | Status: SHIPPED | OUTPATIENT
Start: 2023-11-08

## 2024-01-11 DIAGNOSIS — I10 ESSENTIAL HYPERTENSION, BENIGN: ICD-10-CM

## 2024-01-11 RX ORDER — TRIAMTERENE AND HYDROCHLOROTHIAZIDE 75; 50 MG/1; MG/1
1 TABLET ORAL DAILY
Qty: 90 TABLET | Refills: 0 | Status: SHIPPED | OUTPATIENT
Start: 2024-01-11

## 2024-01-11 NOTE — TELEPHONE ENCOUNTER
Requested Prescriptions     Pending Prescriptions Disp Refills    TRIAMTERENE-HYDROCHLOROTHIAZIDE 75-50 MG Oral Tab [Pharmacy Med Name: TRIAMTERENE-HCTZ 75-50 MG TAB] 90 tablet 0     Sig: TAKE 1 TABLET BY MOUTH EVERY DAY     LOV 9/22/2023     Patient was asked to follow-up in: 6 months    Appointment scheduled: 3/22/2024 Armen Sánchez MD     Medication refilled per protocol.

## 2024-03-13 DIAGNOSIS — I10 ESSENTIAL HYPERTENSION, BENIGN: ICD-10-CM

## 2024-03-18 RX ORDER — METOPROLOL SUCCINATE 50 MG/1
TABLET, EXTENDED RELEASE ORAL
Qty: 90 TABLET | Refills: 0 | Status: SHIPPED | OUTPATIENT
Start: 2024-03-18

## 2024-03-18 NOTE — TELEPHONE ENCOUNTER
Requested Prescriptions     Pending Prescriptions Disp Refills    METOPROLOL SUCCINATE ER 50 MG Oral Tablet 24 Hr [Pharmacy Med Name: METOPROLOL SUCC ER 50 MG TAB] 90 tablet 1     Sig: TAKE 1 TABLET BY MOUTH EVERY DAY     LOV 9/22/2023     Patient was asked to follow-up in: 6 months    Appointment scheduled: 4/1/2024 Armen Sánchez MD     Medication refilled per protocol.

## 2024-03-20 LAB
% FREE PSA: 21 % (CALC)
CREATININE, RANDOM URINE: 78 MG/DL (ref 20–320)
FREE PSA: 1.4 NG/ML
HEMOGLOBIN A1C: 6.4 % OF TOTAL HGB
MICROALBUMIN/CREATININE RATIO, RANDOM URINE: 33 MG/G CREAT
MICROALBUMIN: 2.6 MG/DL
TOTAL PSA: 6.6 NG/ML

## 2024-04-01 ENCOUNTER — OFFICE VISIT (OUTPATIENT)
Dept: FAMILY MEDICINE CLINIC | Facility: CLINIC | Age: 74
End: 2024-04-01
Payer: MEDICARE

## 2024-04-01 VITALS
HEART RATE: 73 BPM | SYSTOLIC BLOOD PRESSURE: 132 MMHG | HEIGHT: 69 IN | WEIGHT: 225.19 LBS | BODY MASS INDEX: 33.35 KG/M2 | OXYGEN SATURATION: 96 % | DIASTOLIC BLOOD PRESSURE: 68 MMHG | TEMPERATURE: 97 F | RESPIRATION RATE: 18 BRPM

## 2024-04-01 DIAGNOSIS — E11.49 TYPE II DIABETES MELLITUS WITH NEUROLOGICAL MANIFESTATIONS (HCC): Primary | ICD-10-CM

## 2024-04-01 DIAGNOSIS — E78.5 HYPERLIPIDEMIA LDL GOAL <100: ICD-10-CM

## 2024-04-01 DIAGNOSIS — I10 BENIGN ESSENTIAL HTN: ICD-10-CM

## 2024-04-01 DIAGNOSIS — R97.20 ELEVATED PSA, LESS THAN 10 NG/ML: ICD-10-CM

## 2024-04-01 PROCEDURE — 99214 OFFICE O/P EST MOD 30 MIN: CPT | Performed by: FAMILY MEDICINE

## 2024-04-01 RX ORDER — DEXAMETHASONE 4 MG/1
TABLET ORAL
COMMUNITY
Start: 2024-01-09

## 2024-04-01 RX ORDER — HYDROCODONE BITARTRATE AND ACETAMINOPHEN 5; 325 MG/1; MG/1
1 TABLET ORAL
COMMUNITY
Start: 2024-01-09

## 2024-04-01 RX ORDER — CLINDAMYCIN HYDROCHLORIDE 300 MG/1
CAPSULE ORAL
COMMUNITY
Start: 2024-01-09

## 2024-04-01 NOTE — PROGRESS NOTES
Chief Complaint   Patient presents with    elevated psa     Pt presents for follow up       HPI:   Thanh Rasheed is a 73 year old male who presents for a diabetic visit.  A1C much better, now 6.4.    Typical blood sugar levels are not checked  Current diabetic medications are: n/a  Diet: cutting carbs out.    Exercise: increased.     Last Eye exam: last   Last Foot exam: +neuropathy.  On electrotherapy daily, and LED treatment.  Seems to be helping some.      Wt Readings from Last 6 Encounters:   24 225 lb 3.2 oz (102.2 kg)   23 250 lb 6 oz (113.6 kg)   23 240 lb (108.9 kg)   23 252 lb (114.3 kg)   22 244 lb 9.6 oz (110.9 kg)   22 240 lb (108.9 kg)      Lipids - newly started on statin.  Tolerating well.      PSA - elevated in the fall.  Still about the same.  PSA from 6.3 to 6.6.  % free about the same - 16% chance of malignancy.      HTN - BP controlled.        Relevant Labs:   Chemistry Labs:   HEMOGLOBIN A1C (%)   Date Value   03/10/2020 6.6 (A)     HEMOGLOBIN A1c (% of total Hgb)   Date Value   2024 6.4 (H)   2023 6.8 (H)   2022 6.6 (H)      Lab Results   Component Value Date/Time     (H) 2023 09:21 AM    HDL 45 2023 09:21 AM    AST 22 2023 09:21 AM    ALT 26 2023 09:21 AM      Lab Results   Component Value Date/Time    CREATSERUM 1.17 2023 09:21 AM        Urine Studies:   Micro Albumen/Creatinine:    Lab Results   Component Value Date    MICROALBCREA 23.4 03/10/2020    MALBCREACALC 33 (H) 2024    MALBCREACALC 52 (H) 2023    MALBCREACALC 16 2022       Social History     Tobacco Use    Smoking status: Former     Packs/day: 0.50     Years: 5.00     Additional pack years: 0.00     Total pack years: 2.50     Types: Cigarettes     Quit date: 1970     Years since quittin.2    Smokeless tobacco: Never   Substance Use Topics    Alcohol use: Yes     Alcohol/week: 1.0 standard drink of alcohol      Types: 1 Standard drinks or equivalent per week     Comment: 1-2 drinks a week       REVIEW OF SYSTEMS:     Constitutional: negative  Eyes: negative  Ears, nose, mouth, throat, and face: negative  Respiratory: negative  Cardiovascular: negative  Gastrointestinal: negative  Genitourinary: negative  Neurological: negative    EXAM:     /68 (BP Location: Left arm, Patient Position: Sitting, Cuff Size: large)   Pulse 73   Temp 96.8 °F (36 °C) (Temporal)   Resp 18   Ht 5' 9\" (1.753 m)   Wt 225 lb 3.2 oz (102.2 kg)   SpO2 96%   BMI 33.26 kg/m²   Wt Readings from Last 6 Encounters:   04/01/24 225 lb 3.2 oz (102.2 kg)   09/22/23 250 lb 6 oz (113.6 kg)   06/29/23 240 lb (108.9 kg)   04/19/23 252 lb (114.3 kg)   09/21/22 244 lb 9.6 oz (110.9 kg)   07/20/22 240 lb (108.9 kg)       General: alert, well appearing, and in no distress  HEENT: pupils equal and reactive, extraocular eye movements intact  CV: regular rate and rhythm, no murmurs  Resp: clear to auscultation, no wheezes, rales or rhonchi, symmetric air entry  Abdomen: soft, nontender, nondistended, no masses or organomegaly  Neuro: alert, oriented, normal speech, no focal findings or movement disorder noted  Ext/Foot: Bilateral barefoot skin diabetic exam is normal, visualized feet and the appearance is normal.  Bilateral monofilament/sensation of both feet is normal.  Pulsation pedal pulse exam of both lower legs/feet is normal as well.    ASSESSMENT AND PLAN:     Thanh Rasheed was seen in the office today:  had concerns including elevated psa (Pt presents for follow up ).    1. 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   Seeing chiro for neuropathy.     2. Benign essential HTN  BP controlled  Stable meds    3. Hyperlipidemia LDL goal <100  On statin  Tolerating well  Will check labs in the fall at annual    4. Elevated PSA, less than 10 ng/ml  Remains  relatviely odalis  Will continue to watch.   Labs ordered          Armen Sánchez M.D.   EM[facility] 3  04/01/24    Return in about 6 months (around 10/1/2024) for Medicare Wellness visit, Diabetic follow-up.

## 2024-04-22 NOTE — PROGRESS NOTES
[facility] PULMONARY  SLEEP PROGRESS NOTE        HPI:   This is a 73 year old male coming in for   Chief Complaint   Patient presents with    Obstructive Sleep Apnea (FRED)     Dme: Ahmet  Mask:        HPI:   Malfunction in heating system  Spoke with AHMET  Spoke to [facility]  Machine can't be repaired  Needs new device    Usage 01/23/2024 - 04/21/2024  Usage days 90/90 days (100%)  >= 4 hours 90 days (100%)  < 4 hours 0 days (0%)  Usage hours 775 hours 4 minutes  Average usage (total days) 8 hours 37 minutes  Average usage (days used) 8 hours 37 minutes  Median usage (days used) 8 hours 35 minutes  Total used hours (value since last reset - 04/21/2024) 5,948 hours  AirSense 11 AutoSet  Serial number [id number]  Mode AutoSet  Min Pressure 10 cmH2O  Max Pressure 14 cmH2O  EPR Fulltime  EPR level 2  Response Standard  Therapy  Pressure - cmH2O Median: 11.5 95th percentile: 13.7 Maximum: 14.0  Leaks - L/min Median: 2.5 95th percentile: 13.6 Maximum: 25.4  Events per hour AI: 1.7 HI: 0.7 AHI: 2.4  Apnea Index Central: 1.4 Obstructive: 0.2 Unknown: 0.0  RERA Index 1.9  Cheyne-Melissa respiration (average duration per night) 0 minutes (0%)  TERESITA VANESSA    Goes to bed around 12am, until 9am  No daytime naps  Feels refreshed  Drinks tea and coffee  Lost 25 pounds    2015 dx  Apnea-hypopnea index 33 events per hour with  associated oxygen natalie of 80%.  Apneas and hypopneas totaled 141 (13  obstructive apneas, 7 central apneas, 121 hypopneas).  Central apneas  were postarousal in nature.  Due to insignificant supine sleep, the  overall AHI and degree of hypoxemia may have been underestimated.     REM AHI 24, non-REM AHI 35 events per hour.  Increased upper airway  resistance including loud crescendo-decrescendo snoring, respiratory  event and snore-related arousals were prominent.    Patient: Sleep review of systems today: see form.      Pt  PCP:  Armen Sánchez MD  No referring provider defined for this encounter.           No data to  display                    Past Medical History:    Allergic rhinitis    hay fever    Basal cell carcinoma of skin of face    left lower eye,right cheek, nose    BCC (basal cell carcinoma), shoulder    left shoulder    Calculus of kidney    Diabetes (HCC)    no meds    Disorder of liver    fatty liver disease    Diverticulosis of large intestine    Essential hypertension    High blood pressure    High cholesterol    Impaired fasting glucose    Nephrolithiasis    Neuropathy    feet    Obesity    Peripheral neuropathy, idiopathic    Sleep apnea    Visual impairment    [id number] glasses     Past Surgical History:   Procedure Laterality Date    Arthroscopy of joint unlisted Right     Meniscus repair right knee    Back surgery  12/1979    kidney stone    Cataract Bilateral     IOL's    Colectomy  01/31/2020    14 inches of colon removed by Dr. Irwin Crooks    Colonoscopy N/A 09/23/2016    Procedure: COLONOSCOPY;  Surgeon: Kimberlyn Leyva DO;  Location:  ENDOSCOPY    Colonoscopy N/A 05/17/2017    Procedure: COLONOSCOPY;  Surgeon: Agustín Serrano DO;  Location:  ENDOSCOPY    Colonoscopy N/A 06/01/2018    Procedure: COLONOSCOPY;  Surgeon: Agustín Serrano DO;  Location:  ENDOSCOPY    Colonoscopy N/A 06/09/2021    Procedure: COLONOSCOPY with snare polypectomy;  Surgeon: Agustín Serrano DO;  Location:  ENDOSCOPY    Egd N/A 09/23/2016    Procedure: ESOPHAGOGASTRODUODENOSCOPY (EGD);  Surgeon: Kimberlyn Leyva DO;  Location:  ENDOSCOPY    Flex sig N/A 10/12/2016    Procedure: FLEXIBLE SIGMOIDOSCOPY;  Surgeon: Agustín Serrano DO;  Location:  ENDOSCOPY    Fracture surgery Left 2010    repair shattered L heel - Metal bolt    Other surgical history  6/2010    left foot    Removal of kidney stone  1980    Skin surgery  08/19/2015    Mohs surgery for BCC to left lower eyelid    Skin surgery      Removal BCC right side of face and left upper back    Tonsillectomy  1954     Social History:  Social History     Social  History Narrative    Not on file     Social History     Socioeconomic History    Marital status:    Occupational History    Occupation: Retired   Tobacco Use    Smoking status: Former     Current packs/day: 0.00     Average packs/day: 0.5 packs/day for 5.0 years (2.5 ttl pk-yrs)     Types: Cigarettes     Start date: 1965     Quit date: 1970     Years since quittin.3    Smokeless tobacco: Never   Vaping Use    Vaping status: Never Used   Substance and Sexual Activity    Alcohol use: Yes     Alcohol/week: 3.0 standard drinks of alcohol     Types: 3 Glasses of wine per week     Comment: wine with meal    Drug use: No   Other Topics Concern    Caffeine Concern Yes     Comment: 1 cup tea daily    Sleep Concern No    Exercise Yes     Comment: light, daily    Seat Belt Yes     Family History:  Family History   Problem Relation Age of Onset    Heart Disorder Father         Heart attack    Diabetes Father         type 2    Hypertension Father         high BP    Cancer Maternal Grandmother         colon cancer    Arthritis Mother     Other (leukemia) Mother     Cancer Mother         leukemia    Cancer Paternal Grandfather     Heart Disorder Paternal Grandmother      Allergies:  Allergies   Allergen Reactions    Soybean Allergy HIVES    Levaquin [Levofloxacin] MYALGIA    Morphine NAUSEA AND VOMITING     Derivatives      Penicillins RASH     Current Meds:  Current Outpatient Medications   Medication Sig Dispense Refill    metoprolol succinate ER 50 MG Oral Tablet 24 Hr TAKE 1 TABLET BY MOUTH EVERY DAY 90 tablet 0    TRIAMTERENE-HYDROCHLOROTHIAZIDE 75-50 MG Oral Tab TAKE 1 TABLET BY MOUTH EVERY DAY 90 tablet 0    lisinopril 40 MG Oral Tab TAKE 1 TABLET BY MOUTH EVERY DAY 90 tablet 1    Naftifine HCl (NAFTIN) 2 % External Gel Apply 1 Application topically daily. 60 g 0    Multiple Vitamin (TAB-A-CHUCHO) Oral Tab Take 1 tablet by mouth daily.      aspirin 81 MG Oral Tab EC Take 1 tablet (81 mg total) by mouth  daily.      dexamethasone (DECADRON) 4 MG tablet TAKE 1 TABLET BY MOUTH THE MORNING OF SURGERY (Patient not taking: Reported on 4/1/2024)      clindamycin 300 MG Oral Cap TAKE 2 TABS ONE HOUR PRIOR TO APPT AND TAKE 1 TAB 3 TIMES A DAY TILL GONE (Patient not taking: Reported on 4/1/2024)      HYDROcodone-acetaminophen 5-325 MG Oral Tab Take 1 tablet by mouth every 4 to 6 hours as needed for Pain. (Patient not taking: Reported on 4/1/2024)      rosuvastatin 5 MG Oral Tab Take 1 tablet (5 mg total) by mouth nightly. 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)    Severe obesity with body mass index (BMI) of 35.0 to 39.9 with comorbidity (HCC)    Arthritis of lumbar spine (HCC)    Microalbuminuria due to type 2 diabetes mellitus (HCC)    Class 1 obesity due to excess calories without serious comorbidity with body mass index (BMI) of 32.0 to 32.9 in adult    Retinal hole of right eye    Arthritis of right knee    Hyperlipidemia LDL goal <100    Neuropathy       REVIEW OF SYSTEMS:   Review of Systems    EXAM:   /74 (BP Location: Right arm, Patient Position: Sitting, Cuff Size: adult)   Pulse 72   Resp 18   Ht 5' 9\" (1.753 m)   Wt 219 lb (99.3 kg)   SpO2 99%   BMI 32.34 kg/m²  Estimated body mass index is 32.34 kg/m² as calculated from the following:    Height as of this encounter: 5' 9\" (1.753 m).    Weight as of this encounter: 219 lb (99.3 kg).   Neck in inches:      Wt Readings from Last 6 Encounters:   04/23/24 219 lb (99.3 kg)   04/01/24 225 lb 3.2 oz (102.2 kg)   09/22/23 250 lb 6 oz (113.6 kg)   06/29/23 240 lb (108.9 kg)   04/19/23 252 lb (114.3 kg)   09/21/22 244 lb 9.6 oz (110.9 kg)     BP Readings from Last 3 Encounters:    04/23/24 122/74   04/01/24 132/68   09/22/23 130/70     Pulse Readings from Last 3 Encounters:   04/23/24 72   04/01/24 73   09/22/23 76     SpO2 Readings from Last 3 Encounters:   04/23/24 99%   04/01/24 96%   09/22/23 98%      Ideal body weight: 70.7 kg (155 lb 13.8 oz)  Adjusted ideal body weight: 82.2 kg (181 lb 1.9 oz)    Vital signs reviewed.  Physical Exam    ASSESSMENT AND PLAN:   1. Obstructive sleep apnea  Order new machine , set pressure 10-15cwp  Patient is using and benefiting from regular cpap use.  Patient was instructed to clean equipment on a weekly basis.  Patient was instructed to keep up to date with supplies.  Patient was informed to avoid drowsy driving, or using heavy machinery.  Patient was instructed to followup in 31-90 days after new pap usage  2. Class 1 obesity due to excess calories without serious comorbidity with body mass index (BMI) of 32.0 to 32.9 in adult  Lost 25 pounds  3. Neuropathy  Idiopathic  Borderline diabetes  Losing weight  There are no Patient Instructions on file for this visit.    Independent interpretation of Sleep Download as defined above.  Continue with Rx management of Sleep apnea with PAP therapy.    COMPLIANCE is required by insurance for 4 hours a night most nights of the week.    Advised if still with sleep apnea and not using CPAP has a 7 fold increase in risk of heart attack, stroke, abnormal heart rhythm  and death,  increased risk of driving accidents.     Advised to refrain from driving when sleepy.      Recommend weight loss, and maintain and optimal BMI with Exercise 30 minutes most days to target heart rate .     Advised patient to change filters,masks,hoses  and tubes and equiptment on a  regular schedule.    Filters and seals shall be changed every 1 month,  Hoses every 3 months,   CPAP mask and humidifier chamber changed every 6 month  with the durable medical equipment provider.         Meds & Refills for this Visit:  Requested Prescriptions       No prescriptions requested or ordered in this encounter       Outcome: Parent verbalizes understanding. Parent is notified to call with any questions, complications, allergies, or worsening or changing symptoms.  Parent is to call with any side effects or complications from the treatments as a result of today.     \" This note was created utilizing Dragon speech recognition software.  Please excuse any grammatical errors. Call my office if you have any questions regarding this note. \"     Rod Tobar,   4/23/2024  10:11 AM

## 2024-04-23 ENCOUNTER — OFFICE VISIT (OUTPATIENT)
Facility: CLINIC | Age: 74
End: 2024-04-23
Payer: MEDICARE

## 2024-04-23 VITALS
HEART RATE: 72 BPM | OXYGEN SATURATION: 99 % | HEIGHT: 69 IN | BODY MASS INDEX: 32.44 KG/M2 | DIASTOLIC BLOOD PRESSURE: 74 MMHG | WEIGHT: 219 LBS | SYSTOLIC BLOOD PRESSURE: 122 MMHG | RESPIRATION RATE: 18 BRPM

## 2024-04-23 DIAGNOSIS — G47.33 OBSTRUCTIVE SLEEP APNEA: Primary | ICD-10-CM

## 2024-04-23 DIAGNOSIS — E66.09 CLASS 1 OBESITY DUE TO EXCESS CALORIES WITHOUT SERIOUS COMORBIDITY WITH BODY MASS INDEX (BMI) OF 32.0 TO 32.9 IN ADULT: ICD-10-CM

## 2024-04-23 DIAGNOSIS — G62.9 NEUROPATHY: ICD-10-CM

## 2024-04-23 PROBLEM — E66.811 CLASS 1 OBESITY DUE TO EXCESS CALORIES WITHOUT SERIOUS COMORBIDITY WITH BODY MASS INDEX (BMI) OF 32.0 TO 32.9 IN ADULT: Status: ACTIVE | Noted: 2022-06-17

## 2024-04-23 PROCEDURE — 99214 OFFICE O/P EST MOD 30 MIN: CPT | Performed by: OTHER

## 2024-05-03 DIAGNOSIS — I10 BENIGN ESSENTIAL HTN: ICD-10-CM

## 2024-05-03 RX ORDER — LISINOPRIL 40 MG/1
TABLET ORAL
Qty: 90 TABLET | Refills: 1 | Status: SHIPPED | OUTPATIENT
Start: 2024-05-03

## 2024-05-03 NOTE — TELEPHONE ENCOUNTER
Requested Prescriptions     Pending Prescriptions Disp Refills    LISINOPRIL 40 MG Oral Tab [Pharmacy Med Name: LISINOPRIL 40 MG TABLET] 90 tablet 1     Sig: TAKE 1 TABLET BY MOUTH EVERY DAY     LOV 4/1/2024     Patient was asked to follow-up in: 6 months    Appointment scheduled: 9/23/2024 Armen Sánchez MD     Medication refilled per protocol.

## 2024-05-10 ENCOUNTER — PATIENT MESSAGE (OUTPATIENT)
Facility: CLINIC | Age: 74
End: 2024-05-10

## 2024-05-10 NOTE — TELEPHONE ENCOUNTER
From: Thanh Rasheed  To: Rod Tobar  Sent: 5/10/2024 9:29 AM CDT  Subject: New CPAP Machine    Dr. Tobar:    During my recent appointment with you on April 23rd, we discussed ordering a new CPAP machine because of a heater error on my machine. You ordered a new machine and I received a text message verifying the order. I don't know how long it takes but I haven't seen or heard anything from [employer] at this point in time. Please let me know if there's any follow up needed on my end.    Thanks,  Chris Rasheed

## 2024-06-16 DIAGNOSIS — I10 ESSENTIAL HYPERTENSION, BENIGN: ICD-10-CM

## 2024-06-20 RX ORDER — METOPROLOL SUCCINATE 50 MG/1
TABLET, EXTENDED RELEASE ORAL
Qty: 90 TABLET | Refills: 0 | Status: SHIPPED | OUTPATIENT
Start: 2024-06-20

## 2024-06-20 NOTE — TELEPHONE ENCOUNTER
Requested Prescriptions     Pending Prescriptions Disp Refills    METOPROLOL SUCCINATE ER 50 MG Oral Tablet 24 Hr [Pharmacy Med Name: METOPROLOL SUCC ER 50 MG TAB] 90 tablet 0     Sig: TAKE 1 TABLET BY MOUTH EVERY DAY     LOV 4/1/2024     Patient was asked to follow-up in: 6 months    Appointment scheduled: 9/23/2024 Armen Sánchez MD     Medication refilled per protocol.

## 2024-07-28 ENCOUNTER — HOSPITAL ENCOUNTER (OUTPATIENT)
Age: 74
Discharge: HOME | End: 2024-07-28
Payer: MEDICARE

## 2024-07-28 VITALS
DIASTOLIC BLOOD PRESSURE: 70 MMHG | BODY MASS INDEX: 34.1 KG/M2 | RESPIRATION RATE: 18 BRPM | TEMPERATURE: 98 F | WEIGHT: 225 LBS | OXYGEN SATURATION: 98 % | HEART RATE: 62 BPM | SYSTOLIC BLOOD PRESSURE: 149 MMHG | HEIGHT: 68 IN

## 2024-07-28 DIAGNOSIS — M54.6 ACUTE RIGHT-SIDED THORACIC BACK PAIN: Primary | ICD-10-CM

## 2024-07-28 LAB
BILIRUB UR QL STRIP: NEGATIVE
CLARITY UR: CLEAR
COLOR UR: YELLOW
GLUCOSE UR STRIP-MCNC: NEGATIVE MG/DL
HGB UR QL STRIP: NEGATIVE
KETONES UR STRIP-MCNC: NEGATIVE MG/DL
LEUKOCYTE ESTERASE UR QL STRIP: NEGATIVE
NITRITE UR QL STRIP: NEGATIVE
PH UR STRIP: 6 [PH]
SP GR UR STRIP: 1.02
UROBILINOGEN UR STRIP-ACNC: <2 MG/DL

## 2024-07-28 PROCEDURE — 81002 URINALYSIS NONAUTO W/O SCOPE: CPT | Performed by: PHYSICIAN ASSISTANT

## 2024-07-28 PROCEDURE — 99213 OFFICE O/P EST LOW 20 MIN: CPT | Performed by: PHYSICIAN ASSISTANT

## 2024-07-28 NOTE — ED PROVIDER NOTES
Patient Seen in: [facility]      History     Chief Complaint   Patient presents with    Back Pain     Stated Complaint: back pain    Subjective:   HPI    74 YO male presents to immediate care for evaluation of right thoracic back pain starting approximately 3 weeks ago. Pain is present only with rotational movements. Characterizes pain as sharp and pain quality feels similar to when he had kidney stones in the past. He denies dysuria, gross hematuria, fever, N/V. Ibuprofen 400 mg helps.         Objective:   Past Medical History:    Allergic rhinitis    hay fever    Basal cell carcinoma of skin of face    left lower eye,right cheek, nose    BCC (basal cell carcinoma), shoulder    left shoulder    Calculus of kidney    Diabetes (HCC)    no meds    Disorder of liver    fatty liver disease    Diverticulosis of large intestine    Essential hypertension    High blood pressure    High cholesterol    Impaired fasting glucose    Nephrolithiasis    Neuropathy    feet    Obesity    Peripheral neuropathy, idiopathic    Sleep apnea    Visual impairment    [id number] glasses              Past Surgical History:   Procedure Laterality Date    Arthroscopy of joint unlisted Right     Meniscus repair right knee    Back surgery  12/1979    kidney stone    Cataract Bilateral     IOL's    Colectomy  01/31/2020    14 inches of colon removed by Dr. Irwin Crooks    Colonoscopy N/A 09/23/2016    Procedure: COLONOSCOPY;  Surgeon: Kimberlyn Leyva DO;  Location:  ENDOSCOPY    Colonoscopy N/A 05/17/2017    Procedure: COLONOSCOPY;  Surgeon: Agustín Serrano DO;  Location:  ENDOSCOPY    Colonoscopy N/A 06/01/2018    Procedure: COLONOSCOPY;  Surgeon: Agustín Serrano DO;  Location:  ENDOSCOPY    Colonoscopy N/A 06/09/2021    Procedure: COLONOSCOPY with snare polypectomy;  Surgeon: Agustín Serrano DO;  Location:  ENDOSCOPY    Egd N/A 09/23/2016    Procedure: ESOPHAGOGASTRODUODENOSCOPY (EGD);  Surgeon: Gordon  DO Kimberlyn;  Location:  ENDOSCOPY    Flex sig N/A 10/12/2016    Procedure: FLEXIBLE SIGMOIDOSCOPY;  Surgeon: Agustín Serrano DO;  Location:  ENDOSCOPY    Fracture surgery Left 2010    repair shattered L heel - Metal bolt    Other surgical history  2010    left foot    Removal of kidney stone      Skin surgery  2015    Mohs surgery for BCC to left lower eyelid    Skin surgery      Removal BCC right side of face and left upper back    Tonsillectomy                  Social History     Socioeconomic History    Marital status:    Occupational History    Occupation: Retired   Tobacco Use    Smoking status: Former     Current packs/day: 0.00     Average packs/day: 0.5 packs/day for 5.0 years (2.5 ttl pk-yrs)     Types: Cigarettes     Start date: 1965     Quit date: 1970     Years since quittin.6    Smokeless tobacco: Never   Vaping Use    Vaping status: Never Used   Substance and Sexual Activity    Alcohol use: Yes     Alcohol/week: 3.0 standard drinks of alcohol     Types: 3 Glasses of wine per week     Comment: wine with meal    Drug use: No   Other Topics Concern    Caffeine Concern Yes     Comment: 1 cup tea daily    Sleep Concern No    Exercise Yes     Comment: light, daily    Seat Belt Yes              Review of Systems    Positive for stated Chief Complaint: Back Pain    Other systems are as noted in HPI.  Constitutional and vital signs reviewed.      All other systems reviewed and negative except as noted above.    Physical Exam     ED Triage Vitals [24 1349]   /70   Pulse 62   Resp 18   Temp 98.2 °F (36.8 °C)   Temp src Temporal   SpO2 98 %   O2 Device None (Room air)       Current Vitals:   Vital Signs  BP: 149/70  Pulse: 62  Resp: 18  Temp: 98.2 °F (36.8 °C)  Temp src: Temporal    Oxygen Therapy  SpO2: 98 %  O2 Device: None (Room air)            Physical Exam  Vitals and nursing note reviewed.   Constitutional:       General: He is not in acute distress.      Appearance: Normal appearance. He is not ill-appearing, toxic-appearing or diaphoretic.   Cardiovascular:      Rate and Rhythm: Normal rate.   Pulmonary:      Effort: Pulmonary effort is normal. No respiratory distress.   Abdominal:      Palpations: Abdomen is soft.      Tenderness: There is no abdominal tenderness. There is no right CVA tenderness, left CVA tenderness or guarding.   Musculoskeletal:      Thoracic back: No tenderness. Normal range of motion (ROM intact with right thoracic back tenderness reproduced during lateral rotation).   Neurological:      Mental Status: He is alert and oriented to person, place, and time.   Psychiatric:         Mood and Affect: Mood normal.         Behavior: Behavior normal.         ED Course     Labs Reviewed   [facility] POCT URINALYSIS DIPSTICK - Abnormal; Notable for the following components:       Result Value    Protein urine Trace (*)     All other components within normal limits       MDM      Differential diagnosis before testing considered but not limited to back strain, nephrolithiasis, other    Mechanical right thoracic back pain. No midline tenderness.  No radiculopathy. No alarm symptoms at this time including bowel or bladder dysfunction, saddle anesthesia or fever. There is no hx of trauma to the area. X-ray is not clinically indicated. Patient endorses concern for possible kidney stone so UA was obtained and is negative for blood. Trace protein, otherwise unremarkable.  No abdominal/flank tenderness. No CVA tenderness bilaterally. Low suspicion for nephrolithiasis.  Informed patient that CT is required to further evaluate this.  Reviewed the limitations of this immediate care.  He deferred ER transfer and feels most comfortable following up with his PCP tomorrow. ER precautions discussed with understanding. He is well-appearing with stable vital signs at discharge.       Medical Decision Making  Amount and/or Complexity of Data Reviewed  Labs: ordered.  Decision-making details documented in ED Course.    Risk  OTC drugs.        Disposition and Plan     Clinical Impression:  1. Acute right-sided thoracic back pain         Disposition:  Discharge  7/28/2024  2:16 pm    Follow-up:  No follow-up provider specified.        Medications Prescribed:  Current Discharge Medication List

## 2024-07-28 NOTE — ED INITIAL ASSESSMENT (HPI)
Pt c/o sharp right flank pain, intermittent x3 weeks, pain getting worse, hx kidney stones, states it feels similar

## 2024-07-28 NOTE — DISCHARGE INSTRUCTIONS
Continue follow-up with your primary care provider as planned.    Continue over-the-counter pain medications for pain relief.      Go directly to the emergency department for any new or worsening symptoms.

## 2024-08-06 ENCOUNTER — TELEPHONE (OUTPATIENT)
Dept: FAMILY MEDICINE CLINIC | Facility: CLINIC | Age: 74
End: 2024-08-06

## 2024-08-06 NOTE — TELEPHONE ENCOUNTER
Pt scheduled apt on 8/27 with following note \"Persistent pain in renal area     Should he be seen sooner?

## 2024-08-27 ENCOUNTER — OFFICE VISIT (OUTPATIENT)
Dept: FAMILY MEDICINE CLINIC | Facility: CLINIC | Age: 74
End: 2024-08-27
Payer: MEDICARE

## 2024-08-27 VITALS
HEART RATE: 66 BPM | SYSTOLIC BLOOD PRESSURE: 122 MMHG | HEIGHT: 68 IN | DIASTOLIC BLOOD PRESSURE: 80 MMHG | OXYGEN SATURATION: 97 % | BODY MASS INDEX: 35.01 KG/M2 | RESPIRATION RATE: 16 BRPM | WEIGHT: 231 LBS

## 2024-08-27 DIAGNOSIS — R10.9 FLANK PAIN: Primary | ICD-10-CM

## 2024-08-27 DIAGNOSIS — Z87.442 HISTORY OF KIDNEY STONES: ICD-10-CM

## 2024-08-27 PROCEDURE — 99214 OFFICE O/P EST MOD 30 MIN: CPT | Performed by: FAMILY MEDICINE

## 2024-08-27 NOTE — PROGRESS NOTES
Chief Complaint   Patient presents with    Urgent Care F/u     Patient here for urgent care follow up      HPI:   Thanh Rasheed is a 73 year old male who presents to the office for eval of R flank pain    R flank pain - started a month ago.  Would come and go.  Did go to urgent care 7/29/24 and had urine testing, but no blood detected.  The pains still ebb and flow.      DM2 - last A1C 6.4 in March.  +micro    HTN - on lisinopril and metoprolol.  Also dyazide.  BP controlled.      REVIEW OF SYSTEMS:   Pertinent items are noted in HPI.  EXAM:   /80   Pulse 66   Resp 16   Ht 5' 8\" (1.727 m)   Wt 231 lb (104.8 kg)   SpO2 97%   BMI 35.12 kg/m²     General appearance - alert, well appearing, and in no distress  Chest - clear to auscultation, no wheezes, rales or rhonchi, symmetric air entry  Heart - normal rate, regular rhythm, normal S1, S2, no murmurs, rubs, clicks or gallops  Abdomen - mild tenderness R flank in the ribs region posteriorly.    ASSESSMENT AND PLAN:     Thanh Rasheed was seen in the office today:  had concerns including Urgent Care F/u (Patient here for urgent care follow up).    1. Flank pain  Episodic sharp pain under posterior ribs R flank  H/o kidney stones.  They felt similar to this.  Will get CT scan.  May also be MSK - sometimes pain flares up with certain movements.  Make sure stretching, using heat on the area.   - CT ABDOMEN+PELVIS KIDNEYSTONE 2D RNDR(NO IV,NO ORAL)(CPT=74176); Future    2. History of kidney stones  As above.       Armen Sánchez M.D.   [facility] 3  08/27/24

## 2024-09-09 ENCOUNTER — HOSPITAL ENCOUNTER (OUTPATIENT)
Dept: CT IMAGING | Facility: HOSPITAL | Age: 74
Discharge: HOME | End: 2024-09-09
Attending: FAMILY MEDICINE
Payer: MEDICARE

## 2024-09-09 DIAGNOSIS — R10.9 FLANK PAIN: ICD-10-CM

## 2024-09-09 PROCEDURE — 74176 CT ABD & PELVIS W/O CONTRAST: CPT | Performed by: FAMILY MEDICINE

## 2024-09-13 DIAGNOSIS — E78.5 HYPERLIPIDEMIA LDL GOAL <100: ICD-10-CM

## 2024-09-15 DIAGNOSIS — I10 ESSENTIAL HYPERTENSION, BENIGN: ICD-10-CM

## 2024-09-16 RX ORDER — METOPROLOL SUCCINATE 50 MG/1
TABLET, EXTENDED RELEASE ORAL
Qty: 90 TABLET | Refills: 0 | Status: SHIPPED | OUTPATIENT
Start: 2024-09-16

## 2024-09-16 RX ORDER — ROSUVASTATIN CALCIUM 5 MG/1
5 TABLET, COATED ORAL NIGHTLY
Qty: 90 TABLET | Refills: 3 | OUTPATIENT
Start: 2024-09-16

## 2024-09-16 NOTE — TELEPHONE ENCOUNTER
Requested Prescriptions     Pending Prescriptions Disp Refills    METOPROLOL SUCCINATE ER 50 MG Oral Tablet 24 Hr [Pharmacy Med Name: METOPROLOL SUCC ER 50 MG TAB] 90 tablet 0     Sig: TAKE 1 TABLET BY MOUTH EVERY DAY     LOV 8/27/2024     Patient was asked to follow-up in: Follow-up not documented in note    Appointment scheduled: 9/23/2024 Armen Sánchez MD     Medication refilled per protocol.

## 2024-09-16 NOTE — TELEPHONE ENCOUNTER
Requested Prescriptions     Pending Prescriptions Disp Refills    ROSUVASTATIN 5 MG Oral Tab [Pharmacy Med Name: ROSUVASTATIN CALCIUM 5 MG TAB] 90 tablet 3     Sig: TAKE 1 TABLET BY MOUTH EVERY DAY AT NIGHT     LOV 8/27/2024     Patient was asked to follow-up in: Follow-up not documented in note    Appointment scheduled: 9/23/2024 Armen Sánchez MD     Medication refilled per protocol.

## 2024-09-18 LAB
% FREE PSA: 22 % (CALC)
ALBUMIN/GLOBULIN RATIO: 1.6 (CALC) (ref 1–2.5)
ALBUMIN: 4 G/DL (ref 3.6–5.1)
ALKALINE PHOSPHATASE: 56 U/L (ref 35–144)
ALT: 17 U/L (ref 9–46)
AST: 20 U/L (ref 10–35)
BILIRUBIN, TOTAL: 0.6 MG/DL (ref 0.2–1.2)
BUN/CREATININE RATIO: 25 (CALC) (ref 6–22)
BUN: 33 MG/DL (ref 7–25)
CALCIUM: 9.5 MG/DL (ref 8.6–10.3)
CARBON DIOXIDE: 22 MMOL/L (ref 20–32)
CHLORIDE: 104 MMOL/L (ref 98–110)
CHOL/HDLC RATIO: 3.9 (CALC)
CHOLESTEROL, TOTAL: 173 MG/DL
CREATININE, RANDOM URINE: 102 MG/DL (ref 20–320)
CREATININE: 1.3 MG/DL (ref 0.7–1.28)
EGFR: 58 ML/MIN/1.73M2
FREE PSA: 2 NG/ML
GLOBULIN: 2.5 G/DL (CALC) (ref 1.9–3.7)
GLUCOSE: 130 MG/DL (ref 65–99)
HDL CHOLESTEROL: 44 MG/DL
HEMOGLOBIN A1C: 6.7 % OF TOTAL HGB
LDL-CHOLESTEROL: 101 MG/DL (CALC)
MICROALBUMIN/CREATININE RATIO, RANDOM URINE: 22 MG/G CREAT
MICROALBUMIN: 2.2 MG/DL
NON-HDL CHOLESTEROL: 129 MG/DL (CALC)
POTASSIUM: 4.6 MMOL/L (ref 3.5–5.3)
PROTEIN, TOTAL: 6.5 G/DL (ref 6.1–8.1)
SODIUM: 139 MMOL/L (ref 135–146)
TOTAL PSA: 9.3 NG/ML
TRIGLYCERIDES: 186 MG/DL

## 2024-09-23 ENCOUNTER — OFFICE VISIT (OUTPATIENT)
Dept: FAMILY MEDICINE CLINIC | Facility: CLINIC | Age: 74
End: 2024-09-23
Payer: MEDICARE

## 2024-09-23 VITALS
RESPIRATION RATE: 16 BRPM | HEART RATE: 65 BPM | SYSTOLIC BLOOD PRESSURE: 110 MMHG | DIASTOLIC BLOOD PRESSURE: 80 MMHG | HEIGHT: 68 IN | OXYGEN SATURATION: 98 % | BODY MASS INDEX: 35.69 KG/M2 | WEIGHT: 235.5 LBS

## 2024-09-23 DIAGNOSIS — N18.31 CKD STAGE 3A, GFR 45-59 ML/MIN (HCC): ICD-10-CM

## 2024-09-23 DIAGNOSIS — R97.20 ELEVATED PSA, LESS THAN 10 NG/ML: ICD-10-CM

## 2024-09-23 DIAGNOSIS — R80.9 MICROALBUMINURIA DUE TO TYPE 2 DIABETES MELLITUS (HCC): ICD-10-CM

## 2024-09-23 DIAGNOSIS — M47.816 ARTHRITIS OF LUMBAR SPINE: ICD-10-CM

## 2024-09-23 DIAGNOSIS — M17.11 ARTHRITIS OF RIGHT KNEE: ICD-10-CM

## 2024-09-23 DIAGNOSIS — E11.49 TYPE II DIABETES MELLITUS WITH NEUROLOGICAL MANIFESTATIONS (HCC): ICD-10-CM

## 2024-09-23 DIAGNOSIS — E66.01 SEVERE OBESITY WITH BODY MASS INDEX (BMI) OF 35.0 TO 39.9 WITH COMORBIDITY (HCC): ICD-10-CM

## 2024-09-23 DIAGNOSIS — E11.29 MICROALBUMINURIA DUE TO TYPE 2 DIABETES MELLITUS (HCC): ICD-10-CM

## 2024-09-23 DIAGNOSIS — I10 ESSENTIAL HYPERTENSION, BENIGN: ICD-10-CM

## 2024-09-23 DIAGNOSIS — G62.9 NEUROPATHY: ICD-10-CM

## 2024-09-23 DIAGNOSIS — Z00.00 ENCOUNTER FOR ANNUAL HEALTH EXAMINATION: Primary | ICD-10-CM

## 2024-09-23 DIAGNOSIS — H33.321 RETINAL HOLE OF RIGHT EYE: ICD-10-CM

## 2024-09-23 DIAGNOSIS — Z85.828 HISTORY OF BASAL CELL CARCINOMA OF EYELID: ICD-10-CM

## 2024-09-23 DIAGNOSIS — I70.0 ABDOMINAL AORTIC ATHEROSCLEROSIS (HCC): ICD-10-CM

## 2024-09-23 DIAGNOSIS — G47.33 OBSTRUCTIVE SLEEP APNEA: ICD-10-CM

## 2024-09-23 DIAGNOSIS — E78.5 HYPERLIPIDEMIA LDL GOAL <100: ICD-10-CM

## 2024-09-23 PROBLEM — E66.09 CLASS 1 OBESITY DUE TO EXCESS CALORIES WITHOUT SERIOUS COMORBIDITY WITH BODY MASS INDEX (BMI) OF 32.0 TO 32.9 IN ADULT: Status: RESOLVED | Noted: 2022-06-17 | Resolved: 2024-09-23

## 2024-09-23 PROBLEM — E66.811 CLASS 1 OBESITY DUE TO EXCESS CALORIES WITHOUT SERIOUS COMORBIDITY WITH BODY MASS INDEX (BMI) OF 32.0 TO 32.9 IN ADULT: Status: RESOLVED | Noted: 2022-06-17 | Resolved: 2024-09-23

## 2024-09-23 RX ORDER — LISINOPRIL 40 MG/1
40 TABLET ORAL DAILY
Qty: 90 TABLET | Refills: 3 | Status: SHIPPED | OUTPATIENT
Start: 2024-09-23

## 2024-09-23 RX ORDER — ROSUVASTATIN CALCIUM 5 MG/1
5 TABLET, COATED ORAL NIGHTLY
Qty: 90 TABLET | Refills: 3 | Status: SHIPPED | OUTPATIENT
Start: 2024-09-23

## 2024-09-23 RX ORDER — TRIAMTERENE AND HYDROCHLOROTHIAZIDE 75; 50 MG/1; MG/1
1 TABLET ORAL DAILY
Qty: 90 TABLET | Refills: 3 | Status: SHIPPED | OUTPATIENT
Start: 2024-09-23

## 2024-09-23 RX ORDER — METOPROLOL SUCCINATE 50 MG/1
50 TABLET, EXTENDED RELEASE ORAL DAILY
Qty: 90 TABLET | Refills: 3 | Status: SHIPPED | OUTPATIENT
Start: 2024-09-23

## 2024-09-23 NOTE — PROGRESS NOTES
Subjective:   Thanh Rasheed is a 73 year old male who presents for a MA AHA (Medicare Advantage Annual Health Assessment) and Subsequent Annual Wellness visit (Pt already had Initial Annual Wellness) and scheduled follow up of multiple significant but stable problems.   Preventative Screening  Colonoscopy - 6/2021.  Repeat 5 yrs.   Prostate - 9.3.  up from 6.6  Immunizations - TDaP 2021.  PNA UTD x 2.      DM2 - controlled at 6.7.  +micro.  No current diabetic meds.  Diet is fair.      HTN - BP controlled.  Taking lisinopril, metoprolol, dyazide.      HLD - on Crestor.  Lipids almost at goal, with .  Wishes to stick with the 5mg.  Atehrosclerosis on the abdominal aorta.      Wt Readings from Last 6 Encounters:   09/23/24 235 lb 8 oz (106.8 kg)   08/27/24 231 lb (104.8 kg)   07/28/24 225 lb (102.1 kg)   04/23/24 219 lb (99.3 kg)   04/01/24 225 lb 3.2 oz (102.2 kg)   09/22/23 250 lb 6 oz (113.6 kg)        History/Other:   Fall Risk Assessment:   He has been screened for Falls and is low risk.      Cognitive Assessment:   He had a completely normal cognitive assessment - see flowsheet entries     Functional Ability/Status:   Thanh Rasheed has some abnormal functions as listed below:  He has Walking problems based on screening of functional status.       Depression Screening (PHQ):  PHQ-2 SCORE: 0  , done 9/23/2024             Advanced Directives:   He does have a Living Will but we do NOT have it on file in Epic.    He does have a POA but we do NOT have it on file in Epic.    Discussed Advance Care Planning with patient (and family/surrogate if present). Standard forms made available to patient in After Visit Summary.      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    Current Medications:  Outpatient Medications Marked as Taking for the 9/23/24 encounter (Office Visit) with Armen Sánchez MD   Medication Sig    rosuvastatin 5 MG Oral Tab Take 1 tablet (5 mg total) by mouth nightly.    triamterene-hydroCHLOROthiazide 75-50 MG Oral Tab Take 1 tablet by mouth daily.    lisinopril 40 MG Oral Tab Take 1 tablet (40 mg total) by mouth daily.    metoprolol succinate ER 50 MG Oral Tablet 24 Hr Take 1 tablet (50 mg total) by mouth daily.    Naftifine HCl (NAFTIN) 2 % External Gel Apply 1 Application topically daily.    Multiple Vitamin (TAB-A-CHUCHO) Oral Tab Take 1 tablet by mouth daily.    aspirin 81 MG Oral Tab EC Take 1 tablet (81 mg total) by mouth daily.       Medical History:  He  has a past medical history of Allergic rhinitis (1952), Basal cell carcinoma of skin of face (06/24/2015), BCC (basal cell carcinoma), shoulder (12/10/2015), Calculus of kidney (06/2020), Diabetes (HCC), Disorder of liver, Diverticulosis of large intestine (10/2019), Essential hypertension (1995), High blood pressure (06/2012), High cholesterol, Impaired fasting glucose (06/29/2012), Nephrolithiasis, Neuropathy, Obesity, Peripheral neuropathy, idiopathic (01/14/2013), Sleep apnea, and Visual impairment.  Surgical History:  He  has a past surgical history that includes removal of kidney stone (1980); skin surgery (08/19/2015); egd (N/A, 09/23/2016); colonoscopy (N/A, 09/23/2016); flex sig (N/A, 10/12/2016); colonoscopy (N/A, 05/17/2017); colonoscopy  (N/A, 2018); cataract (Bilateral); arthroscopy of joint unlisted (Right); Fracture surgery (Left, ); skin surgery; Colectomy (2020); colonoscopy (N/A, 2021); back surgery (1979); tonsillectomy (); and other surgical history (2010).   Family History:  His family history includes Arthritis in his mother; Cancer in his maternal grandmother, mother, and paternal grandfather; Diabetes in his father; Heart Disorder in his father and paternal grandmother; Hypertension in his father; leukemia in his mother.  Social History:  He  reports that he quit smoking about 54 years ago. His smoking use included cigarettes. He started smoking about 59 years ago. He has a 2.5 pack-year smoking history. He has never used smokeless tobacco. He reports current alcohol use of about 3.0 standard drinks of alcohol per week. He reports that he does not use drugs.    Tobacco:  He smoked tobacco in the past but quit greater than 12 months ago.  Social History     Tobacco Use   Smoking Status Former    Current packs/day: 0.00    Average packs/day: 0.5 packs/day for 5.0 years (2.5 ttl pk-yrs)    Types: Cigarettes    Start date: 1965    Quit date: 1970    Years since quittin.7   Smokeless Tobacco Never        CAGE Alcohol Screen:   CAGE screening score of 0 on 2024, showing low risk of alcohol abuse.      Patient Care Team:  Armen Sánchez MD as PCP - General (Family Medicine)  Natali Cornejo PT as Physical Therapist    Review of Systems  Constitutional: negative  Eyes: negative  Ears, nose, mouth, throat, and face: negative  Respiratory: negative  Cardiovascular: negative  Gastrointestinal: negative  Genitourinary: negative  Neurological: negative      Objective:   Physical Exam  General Appearance:  Alert, cooperative, no distress, appears stated age   Head:  Normocephalic, without obvious abnormality, atraumatic   Eyes:  PERRL, conjunctiva/corneas clear, EOM's intact   Neck: Supple,  symmetrical, trachea midline, no adenopathy   Back:   Symmetric, no curvature, ROM normal, no CVA tenderness   Lungs:   Clear to auscultation bilaterally, respirations unlabored   Chest Wall:  No tenderness or deformity   Heart:  Regular rate and rhythm, S1, S2 normal, no murmur, rub or gallop   Abdomen:   Soft, non-tender, bowel sounds active all four quadrants,  no masses, no organomegaly   Extremities: Extremities normal, atraumatic, no cyanosis or edema   Pulses: 2+ and symmetric   Skin: Skin color, texture, turgor normal, no rashes or lesions   Neurologic: Normal      /80   Pulse 65   Resp 16   Ht 5' 8\" (1.727 m)   Wt 235 lb 8 oz (106.8 kg)   SpO2 98%   BMI 35.81 kg/m²  Estimated body mass index is 35.81 kg/m² as calculated from the following:    Height as of this encounter: 5' 8\" (1.727 m).    Weight as of this encounter: 235 lb 8 oz (106.8 kg).    Medicare Hearing Assessment:   Hearing Screening    Time taken: 9/23/2024 10:07 AM  Screening Method: Whisper Test  Whisper Test Result: Pass         Visual Acuity:   Right Eye Visual Acuity: Corrected Right Eye Chart Acuity: 20/25   Left Eye Visual Acuity: Corrected Left Eye Chart Acuity: 20/25   Both Eyes Visual Acuity: Corrected Both Eyes Chart Acuity: 20/25   Able To Tolerate Visual Acuity: Yes        Assessment & Plan:     Thanh Rasheed was seen in the office today:  had concerns including Health Maintenance (MAW).    1. Encounter for annual health examination  Overall doing pretty well  Weight stable, but hoping to get down further  Concern for prostate CA - uro referral placed  C-scope UTD    2. Hyperlipidemia LDL goal <100  On statin  Lipids just above goal with  another year  Hoping to have some weight loss  Wishes to keep the meds the same.   - rosuvastatin 5 MG Oral Tab; Take 1 tablet (5 mg total) by mouth nightly.  Dispense: 90 tablet; Refill: 3    3. Essential hypertension, benign  BP at goal  Stable meds.  Refills provided   -  triamterene-hydroCHLOROthiazide 75-50 MG Oral Tab; Take 1 tablet by mouth daily.  Dispense: 90 tablet; Refill: 3  - metoprolol succinate ER 50 MG Oral Tablet 24 Hr; Take 1 tablet (50 mg total) by mouth daily.  Dispense: 90 tablet; Refill: 3  - lisinopril 40 MG Oral Tab; Take 1 tablet (40 mg total) by mouth daily.  Dispense: 90 tablet; Refill: 3    5. Abdominal aortic atherosclerosis (HCC)  Noted on CT imaging when looking for kidney stone  Risk factor control - BP and lipids.   So far, controlled well.      6. Severe obesity with body mass index (BMI) of 35.0 to 39.9 with comorbidity (HCC)  Weight 235lbs.  BMI 35  Working on getting this down  Hoping to get down 15-20lbs.  Emphasis on diet, food choices, and portions.     7. Type II diabetes mellitus with neurological manifestations ([facility])  8. Microalbuminuria due to type 2 diabetes mellitus ([facility])  DM well controlled - A1C 6.7.  +micro earlier this year, on ACEI.  This has improved  No current DM meds.  Just diet controlled  Continue efforts.       9. Elevated PSA, less than 10 ng/ml  PSA rising.  Now 9.3  Need to see urology for additional workup.  - Urology Referral - In Network    10. CKD stage 3a, GFR 45-59 ml/min ([facility])  Noted on the labs  Very mild with GFR in the upper 50s  Will continue to monitor.     11. Obstructive sleep apnea  He continues regular CPAP use and benefits  Weight loss would be the alternative treatment of choice.     12. Arthritis of right knee  13. Arthritis of lumbar spine ([facility])  Arthralgias noted.    Knee bothers him some.  Less with the back  Continue PRN meds right now  May eventually need replacement, more aggressive treatment, but will monitor at the moment.     14. Retinal hole of right eye  Seeing eye doctors  Stable.     15. Neuropathy  Noted.  Mild, does not limit him  Continue to be active, control the DM as much as able.     16. History of basal cell carcinoma of eyelid  Noted  Continue regular derm skin checks.              Discussed with the patient in person that additional conditions managed outside of the normal physical, and this may incur co-payments as dictated by their insurance provider.        The patient indicates understanding of these issues and agrees to the plan.  Reinforced healthy diet, lifestyle, and exercise.      No follow-ups on file.     Armen Sánchez MD, 9/23/2024     Supplementary Documentation:   General Health:  In the past six months, have you lost more than 10 pounds without trying?: 2 - No  Has your appetite been poor?: No  Type of Diet: Balanced  How does the patient maintain a good energy level?: Appropriate Exercise  How would you describe your daily physical activity?: Light  How would you describe your current health state?: Fair  How do you maintain positive mental well-being?: Social Interaction  On a scale of 0 to 10, with 0 being no pain and 10 being severe pain, what is your pain level?: 4 - (Moderate)  In the past six months, have you experienced urine leakage?: 0-No  At any time do you feel concerned for the safety/well-being of yourself and/or your children, in your home or elsewhere?: No  Have you had any immunizations at another office such as Influenza, Hepatitis B, Tetanus, or Pneumococcal?: No    Health Maintenance   Topic Date Due    Zoster Vaccines (1 of 2) Never done    MA Annual Health Assessment  01/01/2024    Annual Depression Screening  01/01/2024    COVID-19 Vaccine (4 - 2023-24 season) 09/01/2024    Influenza Vaccine (1) 10/01/2024    Diabetes Care A1C  03/17/2025    Diabetes Care Foot Exam  04/01/2025    Diabetes Care Dilated Eye Exam  04/16/2025    Diabetes Care: GFR  09/17/2025    Diabetes Care: Microalb/Creat Ratio  09/18/2025    Colorectal Cancer Screening  06/09/2026    PSA  09/17/2026    Fall Risk Screening (Annual)  Completed    Pneumococcal Vaccine: 65+ Years  Completed

## 2024-10-01 NOTE — TELEPHONE ENCOUNTER
[facility] requested for low dose Aspirin. LOV 11/18/16 and CMP 10/01/16. Will you approve ? Routed to DR Derrek Fields. Outreach attempt was made to schedule a Medicare Wellness Visit. This was the first attempt. Contact was made, [facility] appointment refused. In an effort to ensure that our patients [employer], a Team Member has reviewed your chart and identified an opportunity to provide the best care possible. An attempt was made to discuss or schedule due or overdue Preventive or Chronic Condition care.Care Gaps identified: Wellness Visits.    The Outcome was Contact was made, appointment declined.   Type of Appointment needed: Medicare Wellness Visit     Patient declines in 2024

## 2024-10-28 NOTE — TELEPHONE ENCOUNTER
- only 2 small BM's charted since 10/26 @ 1700. No BM's on writers shift . Pt orientated x's3 but increasingly sluggish in demeanor, writer concerned ammonia level level of 52 from yesterday at 0549 in increasing. Abdomen appears more firm compared to yesterday   Medication refilled per protocol. Requested Prescriptions     Signed Prescriptions Disp Refills   • lisinopril 40 MG Oral Tab 90 tablet 0     Sig: Take 1 tablet (40 mg total) by mouth daily.      Authorizing Provider: Deb Arevalo     Ordering User:

## 2024-12-16 ENCOUNTER — TELEPHONE (OUTPATIENT)
Dept: FAMILY MEDICINE CLINIC | Facility: CLINIC | Age: 74
End: 2024-12-16

## 2024-12-16 LAB — AMB EXT HGBA1C: 6.8 %

## 2024-12-17 NOTE — TELEPHONE ENCOUNTER
Received a fax from [facility] with patient's HbA1c test results, placed in Dr. Sánchez's in box

## 2025-04-23 NOTE — PROGRESS NOTES
YASIRBRUNILDAKENDALL  2025 - 2025  Patient ID: [id number]  : 1950  Age: 74 years  [facility]  [address] [place] [address]  [address] [place] [address]  Phone: [phone]  Email: [email or web address]  Compliance Report  Usage 2025 - 2025  Usage days 90/90 days (100%)  >= 4 hours 90 days (100%)  < 4 hours 0 days (0%)  Usage hours 773 hours 6 minutes  Average usage (total days) 8 hours 35 minutes  Average usage (days used) 8 hours 35 minutes  Median usage (days used) 8 hours 26 minutes  Total used hours (value since last reset - 2025) 9,155 hours  AirSense 11 AutoSet  Serial number [id number]  Mode AutoSet  Min Pressure 10 cmH2O  Max Pressure 14 cmH2O  EPR Fulltime  EPR level 2  Response Standard  Therapy  Pressure - cmH2O Median: 12.0 95th percentile: 13.7 Maximum: 13.9  Leaks - L/min Median: 4.5 95th percentile: 17.6 Maximum: 31.0  Events per hour AI: 1.1 HI: 0.8 AHI: 1.9  Apnea Index Central: 0.9 Obstructive: 0.2 Unknown: 0.0  RERA Index 1.7  Cheyne-Melissa respiration (average duration per night) 0 minutes (0%)

## 2025-04-24 ENCOUNTER — OFFICE VISIT (OUTPATIENT)
Facility: CLINIC | Age: 75
End: 2025-04-24
Payer: MEDICARE

## 2025-04-24 VITALS
DIASTOLIC BLOOD PRESSURE: 72 MMHG | RESPIRATION RATE: 18 BRPM | HEIGHT: 68 IN | BODY MASS INDEX: 36.53 KG/M2 | WEIGHT: 241 LBS | HEART RATE: 64 BPM | SYSTOLIC BLOOD PRESSURE: 128 MMHG | OXYGEN SATURATION: 98 %

## 2025-04-24 DIAGNOSIS — G47.33 OBSTRUCTIVE SLEEP APNEA: Primary | ICD-10-CM

## 2025-04-24 PROCEDURE — 1160F RVW MEDS BY RX/DR IN RCRD: CPT | Performed by: PHYSICIAN ASSISTANT

## 2025-04-24 PROCEDURE — 1159F MED LIST DOCD IN RCRD: CPT | Performed by: PHYSICIAN ASSISTANT

## 2025-04-24 PROCEDURE — 99213 OFFICE O/P EST LOW 20 MIN: CPT | Performed by: PHYSICIAN ASSISTANT

## 2025-04-24 NOTE — PROGRESS NOTES
[facility]  SLEEP PROGRESS NOTE        THANH RASHEED is a 74 year old male who presents today for annual      Chief Complaint   Patient presents with    Obstructive Sleep Apnea (FRED)     Dme:                The following individual(s) verbally consented to be recorded using ambient AI listening technology and understand that they can each withdraw their consent to this listening technology at any point by asking the clinician to turn off or pause the recording:    Patient name: Thanh Rasheed  Additional names:  NA    History of Present Illness  The patient presents for an annual follow-up for sleep and CPAP.     He reports a previous issue with his CPAP machine, which was giving error messages about the heater thermostat not operating correctly. The patient requested a new machine, but the insurance denied it as it was under 5 years old. However, the machine seems to be working fine now, possibly due to remote diagnostics or adjustments by the company.     The patient has a history of injuries, including a dislocated left shoulder and a bad right knee, which can cause discomfort when lying in certain positions. .     [place] score:     In bed 11p-12a  Out of bed 830-9a  SL up to an hour  Nighttime awakenings 1x RR  Naps none  Caffeine 2 cups coffee daily, occ tea, occ non caffeinated soda    Denies teeth grinding, restless legs, headaches, dry mouth, tinnitus, chest pain, thoracic back pain, bloating, drowsy driving, sleep walking, sleep talking    DME company: [employer]  Mask type: FFM   Set up date: 2021    THANH RASHEED  2025 - 2025  Patient ID: [id number]  : 1950  Age: 74 years  [facility]  [address] [place] [address]  [address] [place] [address]  Phone: [phone]  Email: [email or web address]  Compliance Report  Usage 2025 - 2025  Usage days 90/90 days (100%)  >= 4 hours 90 days (100%)  < 4 hours 0 days (0%)  Usage hours 773 hours 6  minutes  Average usage (total days) 8 hours 35 minutes  Average usage (days used) 8 hours 35 minutes  Median usage (days used) 8 hours 26 minutes  Total used hours (value since last reset - 04/22/2025) 9,155 hours  AirSense 11 AutoSet  Serial number [id number]  Mode AutoSet  Min Pressure 10 cmH2O  Max Pressure 14 cmH2O  EPR Fulltime  EPR level 2  Response Standard  Therapy  Pressure - cmH2O Median: 12.0 95th percentile: 13.7 Maximum: 13.9  Leaks - L/min Median: 4.5 95th percentile: 17.6 Maximum: 31.0  Events per hour AI: 1.1 HI: 0.8 AHI: 1.9  Apnea Index Central: 0.9 Obstructive: 0.2 Unknown: 0.0  RERA Index 1.7  Cheyne-Melissa respiration (average duration per night) 0 minutes (0%)       Patient: Sleep review of systems today: see form.    2/12/2015 PSG  244#  RESPIRATORY FINDINGS:  Apnea-hypopnea index 33 events per hour with  associated oxygen natalie of 80%.  Apneas and hypopneas totaled 141 (13  obstructive apneas, 7 central apneas, 121 hypopneas).  Central apneas  were postarousal in nature.  Due to insignificant supine sleep, the  overall AHI and degree of hypoxemia may have been underestimated.  REM AHI 24, non-REM AHI 35 events per hour.  Increased upper airway  resistance including loud crescendo-decrescendo snoring, respiratory  event and snore-related arousals were prominent.  OXYGENATION:  The patient's spent 11 minutes or 3% of recording with  oxygen below 88%, reaching a natalie of 80% during REM.  Average oxygen  91% REM, 92% non-REM.  EMG:  Periodic limb movements occurred with high frequency at a rate  of 55 per hour.  PLM with arousal 17 per hour.  PLMs were prominent  throughout the recording.  REM EMG was normal.      Pt  PCP:  Armen Sánchez MD  No referring provider defined for this encounter.           No data to display                  Past Medical History[1]  Past Surgical History[2]  Social History:  Social History     Social History Narrative    Not on file     Short Social Hx on  File[3]  Family History:  Family History[4]  Allergies:  Allergies[5]  Current Meds:  Current Medications[6]   Counseling given: Not Answered         Problem List:  Problem List[7]    REVIEW OF SYSTEMS:   Review of Systems  See HPI     EXAM:   /72 (BP Location: Right arm, Patient Position: Sitting, Cuff Size: adult)   Pulse 64   Resp 18   Ht 5' 8\" (1.727 m)   Wt 241 lb (109.3 kg)   SpO2 98%   BMI 36.64 kg/m²  Estimated body mass index is 36.64 kg/m² as calculated from the following:    Height as of this encounter: 5' 8\" (1.727 m).    Weight as of this encounter: 241 lb (109.3 kg).   Neck in inches:      Wt Readings from Last 6 Encounters:   04/24/25 241 lb (109.3 kg)   09/23/24 235 lb 8 oz (106.8 kg)   08/27/24 231 lb (104.8 kg)   07/28/24 225 lb (102.1 kg)   04/23/24 219 lb (99.3 kg)   04/01/24 225 lb 3.2 oz (102.2 kg)     BP Readings from Last 3 Encounters:   04/24/25 128/72   09/23/24 110/80   08/27/24 122/80     Pulse Readings from Last 3 Encounters:   04/24/25 64   09/23/24 65   08/27/24 66     SpO2 Readings from Last 3 Encounters:   04/24/25 98%   09/23/24 98%   08/27/24 97%      Patient weight not recorded    Vital signs reviewed.  Physical Exam  Vitals and nursing note reviewed.   Constitutional:       Appearance: Normal appearance. He is obese.   HENT:      Head: Normocephalic and atraumatic.      Right Ear: External ear normal.      Left Ear: External ear normal.   Pulmonary:      Effort: Pulmonary effort is normal. No respiratory distress.   Musculoskeletal:      Cervical back: Normal range of motion and neck supple.   Neurological:      General: No focal deficit present.      Mental Status: He is alert and oriented to person, place, and time.   Psychiatric:         Attention and Perception: Attention and perception normal.         Mood and Affect: Mood and affect normal.         Speech: Speech normal.         Behavior: Behavior normal. Behavior is cooperative.         Thought Content: Thought  content normal.         Cognition and Memory: Cognition and memory normal.         Judgment: Judgment normal.        Assessment & Plan  Obstructive Sleep Apnea- severe AHI 33  Obstructive sleep apnea well-managed with CPAP, reducing events from 33 to 1.9 per hour. No air pressure or machine issues. Mask irritation possibly from cleaning method. Declined Zepbound for weight management.  - Continue current CPAP settings and usage.  - Reorder CPAP supplies.  - Advised using hand soap and water or CPAP wet wipes for mask cleaning instead of alcohol wipes.    Patient is using and benefiting from regular cpap use.  Patient was instructed to clean equipment on a weekly basis.  Patient was instructed to keep up to date with supplies.  Patient was informed to avoid drowsy driving, or using heavy machinery.  Patient was instructed to followup on a annual basis.      There are no Patient Instructions on file for this visit.    Independent interpretation of Sleep Download as defined above.  Continue with Rx management of Sleep apnea with PAP therapy.    COMPLIANCE is required by insurance for 4 hours a night most nights of the week.    Advised if still with sleep apnea and not using CPAP has a 7 fold increase in risk of heart attack, stroke, abnormal heart rhythm  and death,  increased risk of driving accidents.     Advised to refrain from driving when sleepy.      Recommend weight loss, and maintain and optimal BMI with Exercise 30 minutes most days to target heart rate .     Advised patient to change filters,masks,hoses  and tubes and equiptment on a  regular schedule.    Filters and seals shall be changed every 1 month,  Hoses every 3 months,   CPAP mask and humidifier chamber changed every 6 month  with the durable medical equipment provider.         Meds & Refills for this Visit:  Requested Prescriptions      No prescriptions requested or ordered in this encounter       Outcome: Parent verbalizes understanding. Parent is  notified to call with any questions, complications, allergies, or worsening or changing symptoms.  Parent is to call with any side effects or complications from the treatments as a result of today.     \" This note was created utilizing Dragon speech recognition software.  Please excuse any grammatical errors. Call my office if you have any questions regarding this note. \"     Bronwyn Saucedo PA-C           [1]   Past Medical History:   Allergic rhinitis    hay fever    Basal cell carcinoma of skin of face    left lower eye,right cheek, nose    BCC (basal cell carcinoma), shoulder    left shoulder    Calculus of kidney    Diabetes (HCC)    no meds    Disorder of liver    fatty liver disease    Diverticulosis of large intestine    Essential hypertension    High blood pressure    High cholesterol    Impaired fasting glucose    Nephrolithiasis    Neuropathy    feet    Obesity    Peripheral neuropathy, idiopathic    Sleep apnea    Visual impairment    [id number] glasses   [2]   Past Surgical History:  Procedure Laterality Date    Arthroscopy of joint unlisted Right     Meniscus repair right knee    Back surgery  12/1979    kidney stone    Cataract Bilateral     IOL's    Colectomy  01/31/2020    14 inches of colon removed by Dr. Irwin Crooks    Colonoscopy N/A 09/23/2016    Procedure: COLONOSCOPY;  Surgeon: Kimberlyn Leyva DO;  Location:  ENDOSCOPY    Colonoscopy N/A 05/17/2017    Procedure: COLONOSCOPY;  Surgeon: Agustín Serrano DO;  Location:  ENDOSCOPY    Colonoscopy N/A 06/01/2018    Procedure: COLONOSCOPY;  Surgeon: Agustín Serrano DO;  Location:  ENDOSCOPY    Colonoscopy N/A 06/09/2021    Procedure: COLONOSCOPY with snare polypectomy;  Surgeon: Agustín Serrano DO;  Location:  ENDOSCOPY    Egd N/A 09/23/2016    Procedure: ESOPHAGOGASTRODUODENOSCOPY (EGD);  Surgeon: Kimberlyn Leyva DO;  Location:  ENDOSCOPY    Flex sig N/A 10/12/2016    Procedure: FLEXIBLE SIGMOIDOSCOPY;  Surgeon: Agustín Serrano DO;   Location: [facility]    Fracture surgery Left     repair shattered L heel - Metal bolt    Other surgical history  2010    left foot    Removal of kidney stone      Skin surgery  2015    Mohs surgery for BCC to left lower eyelid    Skin surgery      Removal BCC right side of face and left upper back    Tonsillectomy     [3]   Social History  Socioeconomic History    Marital status:    Occupational History    Occupation: Retired   Tobacco Use    Smoking status: Former     Current packs/day: 0.00     Average packs/day: 0.5 packs/day for 5.0 years (2.5 ttl pk-yrs)     Types: Cigarettes     Start date: 1965     Quit date: 1970     Years since quittin.3    Smokeless tobacco: Never   Vaping Use    Vaping status: Never Used   Substance and Sexual Activity    Alcohol use: Yes     Alcohol/week: 3.0 standard drinks of alcohol     Types: 3 Glasses of wine per week     Comment: wine with meal    Drug use: No   Other Topics Concern    Caffeine Concern Yes     Comment: 1 cup tea daily    Sleep Concern No    Exercise Yes     Comment: light, daily    Seat Belt Yes   [4]   Family History  Problem Relation Age of Onset    Heart Disorder Father         Heart attack    Diabetes Father         type 2    Hypertension Father         high BP    Heart Disease Father     Cancer Maternal Grandmother         colon cancer    Arthritis Mother     Other (leukemia) Mother     Cancer Mother         leukemia    Cancer Paternal Grandfather     Heart Disorder Paternal Grandmother     Heart Disorder Brother         Heart attack   [5]   Allergies  Allergen Reactions    Soybean Allergy HIVES    Levaquin [Levofloxacin] MYALGIA    Morphine NAUSEA AND VOMITING     Derivatives      Penicillins RASH   [6]   Current Outpatient Medications   Medication Sig Dispense Refill    rosuvastatin 5 MG Oral Tab Take 1 tablet (5 mg total) by mouth nightly. 90 tablet 3    triamterene-hydroCHLOROthiazide 75-50 MG Oral Tab Take 1  tablet by mouth daily. 90 tablet 3    lisinopril 40 MG Oral Tab Take 1 tablet (40 mg total) by mouth daily. 90 tablet 3    metoprolol succinate ER 50 MG Oral Tablet 24 Hr Take 1 tablet (50 mg total) by mouth daily. 90 tablet 3    Naftifine HCl (NAFTIN) 2 % External Gel Apply 1 Application topically daily. 60 g 0    Multiple Vitamin (TAB-A-CHUCHO) Oral Tab Take 1 tablet by mouth daily.      aspirin 81 MG Oral Tab EC Take 1 tablet (81 mg total) by mouth daily.     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

## 2025-08-21 ENCOUNTER — TELEPHONE (OUTPATIENT)
Dept: FAMILY MEDICINE CLINIC | Facility: CLINIC | Age: 75
End: 2025-08-21

## 2025-08-21 DIAGNOSIS — I10 BENIGN ESSENTIAL HTN: ICD-10-CM

## 2025-08-21 DIAGNOSIS — R97.20 ELEVATED PSA, LESS THAN 10 NG/ML: ICD-10-CM

## 2025-08-21 DIAGNOSIS — E78.5 HYPERLIPIDEMIA LDL GOAL <100: ICD-10-CM

## 2025-08-21 DIAGNOSIS — E11.49 TYPE II DIABETES MELLITUS WITH NEUROLOGICAL MANIFESTATIONS (HCC): ICD-10-CM

## (undated) DIAGNOSIS — I10 BENIGN ESSENTIAL HTN: ICD-10-CM

## (undated) DIAGNOSIS — M79.672 LEFT FOOT PAIN: Primary | ICD-10-CM

## (undated) DEVICE — CANNULA SEAL

## (undated) DEVICE — 40580 - THE PINK PAD - ADVANCED TRENDELENBURG POSITIONING KIT: Brand: 40580 - THE PINK PAD - ADVANCED TRENDELENBURG POSITIONING KIT

## (undated) DEVICE — Device: Brand: DEFENDO AIR/WATER/SUCTION AND BIOPSY VALVE

## (undated) DEVICE — TUBING CYSTO

## (undated) DEVICE — FILTERLINE NASAL ADULT O2/CO2

## (undated) DEVICE — SUTURE VICRYL 5-0 PC-1

## (undated) DEVICE — SUTURE PROLENE 2-0 SH

## (undated) DEVICE — ENDOSCOPY PACK - LOWER: Brand: MEDLINE INDUSTRIES, INC.

## (undated) DEVICE — MONOFILAMENT ABSORBABLE SUTURE: Brand: MAXON

## (undated) DEVICE — VIOLET BRAIDED (POLYGLACTIN 910), SYNTHETIC ABSORBABLE SUTURE: Brand: COATED VICRYL

## (undated) DEVICE — CADIERE FORCEPS: Brand: ENDOWRIST

## (undated) DEVICE — COVER,MAYO STAND,STERILE: Brand: MEDLINE

## (undated) DEVICE — TRAP 4 CPTR CHMBR N EZ INLN

## (undated) DEVICE — MEDI-VAC NON-CONDUCTIVE SUCTION TUBING: Brand: CARDINAL HEALTH

## (undated) DEVICE — CLOSING BUNDLE: Brand: MEDLINE INDUSTRIES, INC.

## (undated) DEVICE — SIGMOIDOSCOPE LIGHTED BIOSEAL

## (undated) DEVICE — SUTURE VICRYL 0

## (undated) DEVICE — BLADELESS OBTURATOR: Brand: WECK VISTA

## (undated) DEVICE — ENDOPATH ULTRA VERESS INSUFFLATION NEEDLES WITH LUER LOCK CONNECTORS: Brand: ENDOPATH

## (undated) DEVICE — BAG DRAIN INFECTION CNTRL 2000

## (undated) DEVICE — TROCAR: Brand: KII SHIELDED BLADED ACCESS SYSTEM

## (undated) DEVICE — 3M™ STERI-STRIP™ REINFORCED ADHESIVE SKIN CLOSURES, R1547, 1/2 IN X 4 IN (12 MM X 100 MM), 6 STRIPS/ENVELOPE: Brand: 3M™ STERI-STRIP™

## (undated) DEVICE — DRAIN CHANNEL 19FR BLAKE

## (undated) DEVICE — MEDI-VAC SUCTION HANDLE REGULAR CAPACITY: Brand: CARDINAL HEALTH

## (undated) DEVICE — SUTURE PDS II 0 CT-1

## (undated) DEVICE — BETADINE SOLUTION 8 OZ BTL

## (undated) DEVICE — STAPLER CIRCULAR ENDO 29MM

## (undated) DEVICE — STAPLER 45 RELOAD BLUE: Brand: ENDOWRIST

## (undated) DEVICE — BULB SYRINGE,IRRIGATION WITH PROTECTIVE CAP: Brand: DOVER

## (undated) DEVICE — TIP-UP FENESTRATED GRASPER: Brand: ENDOWRIST

## (undated) DEVICE — SNARE 9MM 230CM 2.4MM EXACTO

## (undated) DEVICE — VISUALIZATION SYSTEM: Brand: CLEARIFY

## (undated) DEVICE — STAPLER SHEATH: Brand: ENDOWRIST

## (undated) DEVICE — STAPLER 45: Brand: ENDOWRIST

## (undated) DEVICE — GOWN,SIRUS,FABRIC-REINFORCED,X-LARGE: Brand: MEDLINE

## (undated) DEVICE — DRAIN RELIAVAC 100CC

## (undated) DEVICE — Device

## (undated) DEVICE — 1200CC GUARDIAN II: Brand: GUARDIAN

## (undated) DEVICE — FORCEP BIOPSY RJ4 LG CAP W/ND

## (undated) DEVICE — ARM DRAPE

## (undated) DEVICE — SUTURE ETHILON 2-0 FS

## (undated) DEVICE — MEDI-VAC TUBING CONNECTOR 6-IN-1 "Y" POLYPROPYLENE: Brand: CARDINAL HEALTH

## (undated) DEVICE — SEAL

## (undated) DEVICE — SNARE CAPTIFLEX MICRO-OVL OLY

## (undated) DEVICE — COLUMN DRAPE

## (undated) DEVICE — PERMANENT CAUTERY HOOK: Brand: ENDOWRIST

## (undated) DEVICE — BLADE ELECTRODE: Brand: EDGE

## (undated) DEVICE — REDUCER: Brand: ENDOWRIST

## (undated) DEVICE — VESSEL SEALER EXTEND: Brand: ENDOWRIST

## (undated) DEVICE — STERILE POLYISOPRENE POWDER-FREE SURGICAL GLOVES: Brand: PROTEXIS

## (undated) DEVICE — LAP COLON CDS: Brand: MEDLINE INDUSTRIES, INC.

## (undated) DEVICE — UNDYED BRAIDED (POLYGLACTIN 910), SYNTHETIC ABSORBABLE SUTURE: Brand: COATED VICRYL

## (undated) DEVICE — KENDALL SCD EXPRESS SLEEVES, KNEE LENGTH, MEDIUM: Brand: KENDALL SCD

## (undated) DEVICE — SOL  .9 3000ML

## (undated) NOTE — LETTER
[facility]  Vicky Archuleta 60 [address] [facility], [address] [place] [address]    Consent for Operation    Date: __________________    Time: _______________    1.  I authorize the performance upon Suzy Velazquez the following operation:    Procedure(s):  COLONOSCOPY     2. I [facility] will not be responsible for storage or maintenance of this tape. 6. For the purpose of advancing medical education, I consent to the admittance of observers to the Operating Room.     7. I authorize the use of any specimen, organs, tissues, body patient is a minor or mentally incompetent to give consent:  Signature of person authorized to consent for patient: ___________________________   Relationship to patient: ____________________________________________________    Signature of Witness: _______ complications. · If I am allergic to anything or have had a reaction to anesthesia before. 3. I understand how the anesthesia medicine will help me (benefits). 4. I understand that with any type of anesthesia medicine there are risks:  a.  The most c to have anesthesia services.     _____________________________________________________________________________  Witness        Date   Time  I have verified that the signature is that of the patient or patient’s representative, and that it was signed before

## (undated) NOTE — IP AVS SNAPSHOT
[facility] [place] [address] [place], [address] ~ [phone]                Discharge Summary   5/17/2017    Isaac Do           Admission Information        Provider Department    5/17/2017 Agustín Serrano DO  Endoscopy TAKE ONE-HALF TABLET BY MOUTH DAILY    Vivienne Jackson     [    ]    [    ]    [    ]    [    ]                 Patient Instructions       Home Care Instructions for Colonoscopy  with Sedation    Diet:  - Resume your regular diet as tolerated unless othe INFLUENZA  Deferred (Patient Refused)    Pneumococcal (Prevnar 13)  Deferred (Patient Refused)      Radiology Exams     None         Additional Information       We are concerned for your overall well being:    - If you are a smoker or have smoked in the

## (undated) NOTE — Clinical Note
ROSHNII, TCM call made, see notes. KWAME attempted to schedule a TCM HFU patient states he has an appointment with Dr. Rob Pérez on 3/10/2020.  KWAME explained that it is recommended that he be seen within 2 weeks of discharge from the hospital, patient states he will

## (undated) NOTE — LETTER
[address] [place] [address], [address] [place], [place] [address]  [phone]     Yue Irish,     This letter is to inform you that you are due for a diabetic eye exam. A referral has been placed for you to see

## (undated) NOTE — LETTER
19    Patient: Shiva Arroyo  : 1950 Visit date: 2019    Dear  Dr. Marah Bethea MD,    Thank you for referring Shiva Arroyo to my practice. Please find my assessment and plan below.              Assessment   Diverticulitis of large intesti He is tolerating a diet. He points to the lower abdomen across the beltline as the point of where his symptoms had previously taken place. There was definitely a predilection of left greater than right.     This patient has extensive travel plans includin Despite the above listed conversation I have a very slight preference to operate in an elective basis.   If the [facility] trip did not involve other family members and was not of an important gathering, I would tell him to avoid travel to [facility] this quick aft

## (undated) NOTE — LETTER
6/30/2021              [address]        [address]        [place] [facility] [address]         To Whom It May Concern,    Please put [facility] membership on hold due to medical reasons.  Please extend this hold un

## (undated) NOTE — LETTER
19    Patient: Suzy Velazquez  : 1950 Visit date: 2019    Dear  Dr. Shawn Allison MD,    Thank you for referring Suzy Velazquez to my practice. Please find my assessment and plan below.       Assessment   Diverticulitis of large intestine wit he had fluid around the colon and evidence of microperforations. This was a severe attack of diverticulitis, and was likely not his first attack.     The patient has a past surgical history significant for an endoscopic mucosal resection of a tubular adeno The patient should see his primary care provider Dr. Kings Ramirez prior to his procedure to discuss his cough that he has been having since his hospitalization and for medical clearance prior to the procedure.   The patient will need to be off of his aspirin for

## (undated) NOTE — LETTER
Date: 1/19/2021    Patient Name: Gurdeep Rivera          To Whom it may concern: This letter has been written at the patient's request. The above patient was seen at the [facility] for treatment of a medical condition.     Due to the ongoin

## (undated) NOTE — Clinical Note
How can I update his DM foot and eye exam?  Eye exam done last year 8/10/17.   We do have the record

## (undated) NOTE — LETTER
[address] [facility], [address] [facility], [address], [place] [address] [facility] [address] [facility] 121        Gui Washington MD  • Angy Ferraro MD • Radha Muhammad MD • DO Giovani Andersen PA-C • CODY Tafoya

## (undated) NOTE — Clinical Note
Salvatore Wheeler, I saw Natty Sanchez today for an upper respiratory illness. His blood pressure was significantly elevated today--180/100. He declined recommendation of ER visit, but agreed to check his bp at home and contact you today for instructions.   He has no

## (undated) NOTE — Clinical Note
ROSHNII, TCM call made, see notes. NCM confirmed with patient that he has a HFU on 10/28/19 with his PCP. NCM rescheduled to a TCM HFU on 10/25/19, for a closer follow up.